# Patient Record
Sex: MALE | Race: WHITE | HISPANIC OR LATINO | Employment: OTHER | URBAN - METROPOLITAN AREA
[De-identification: names, ages, dates, MRNs, and addresses within clinical notes are randomized per-mention and may not be internally consistent; named-entity substitution may affect disease eponyms.]

---

## 2017-12-04 ENCOUNTER — ALLSCRIPTS OFFICE VISIT (OUTPATIENT)
Dept: OTHER | Facility: OTHER | Age: 52
End: 2017-12-04

## 2017-12-04 DIAGNOSIS — I10 ESSENTIAL (PRIMARY) HYPERTENSION: ICD-10-CM

## 2017-12-06 NOTE — PROGRESS NOTES
Assessment    1  Benign essential hypertension (401 1) (I10)   2  Allergic rhinitis (477 9) (J30 9)   3  Dermatitis (692 9) (L30 9)   4  Allergic conjunctivitis (372 14) (H10 10)    Plan  Benign essential hypertension    · AmLODIPine Besylate 10 MG Oral Tablet; take 1 tablet by mouth every day   · (1) CBC/PLT/DIFF; Status:Active; Requested for:[id number];    · (1) COMPREHENSIVE METABOLIC PANEL; Status:Active; Requested for:05Vak1625;    · (1) LIPID PANEL, FASTING; Status:Active; Requested for:09Ffj0751;    · (1) TSH; Status:Active; Requested for:71Qbo5898;   Dermatitis    · PredniSONE 10 MG Oral Tablet; take three tabs daily for three days, take two tabsdaily for three days, then take one tab daily for three days then stolp    Discussion/Summary  Discussion Summary:   Hypertension -his blood pressure was very high today  It has been high over the last few years but he has not been taking any medication  I started him on amlodipine 10 mg daily  He will come back in 2 weeks for recheck of blood pressure  rhinitis- he takes Allegra for a few years now  He does not think it is working  He was told to switch to Zyrtec 10 mg daily  He will also take Flonase nasal spray -he has a rash around his eyes, on his forehead and at the back of his neck  He was given prednisone  conjunctivitis- he has watering of his eyes which initially started the rash around the eyes  He was given erythromycin ointment by an urgent care center  He thinks that it is getting better  It does not improve, he will see Ophthalmology  Counseling Documentation With Imm: The patient was counseled regarding instructions for management,-- risk factor reductions,-- risks and benefits of treatment options,-- importance of compliance with treatment  Medication SE Review and Pt Understands Tx: Possible side effects of new medications were reviewed with the patient/guardian today  The treatment plan was reviewed with the patient/guardian   The patient/guardian understands and agrees with the treatment plan      Chief Complaint  Chief Complaint Free Text Note Form: establish care      History of Present Illness  Conjunctivitis (Brief): The patient is being seen for an initial evaluation of an existing diagnosis of conjunctivitis  Symptoms:  eye discharge,-- eye irritation-- and-- eye itching  Associated symptoms:  lid swelling  Current treatment includes topical antibiotic  Rhinitis (Brief): The patient is being seen for an initial evaluation of an existing diagnosis of rhinitis  Symptoms include:  nasal congestion,-- sneezing,-- postnasal drainage,-- sore throat,-- eye redness,-- eye itching-- and-- eye watering  Known allergies:  dogs,-- cats,-- dust mites-- and-- mold spores  No associated symptoms are reported  Current treatment includes nonsedating antihistamines  Hypertension (Follow-Up): The patient presents for follow-up of essential hypertension  The patient states he has been doing poorly with his blood pressure control since the last visit  He has no comorbid illnesses  He has no significant interval events  Symptoms: The patient is currently asymptomatic  Medications: The patient is not currently on any medications for his hypertension--lifestyle modification only  Dermatitis, Unspecified (Brief): The patient is being seen for an initial evaluation of unspecified dermatitis  Symptoms:  rash,-- skin redness,-- skin dryness-- and-- pruritus  No associated symptoms are reported  The patient is not currently being treated for this problem  Review of Systems  Complete-Male:  Constitutional: No fever or chills, feels well, no tiredness, no recent weight gain or weight loss  Eyes: No complaints of eye pain, no red eyes, no discharge from eyes, no itchy eyes  ENT: as noted in HPI  Cardiovascular: No complaints of slow heart rate, no fast heart rate, no chest pain, no palpitations, no leg claudication, no lower extremity    Respiratory: No complaints of shortness of breath, no wheezing, no cough, no SOB on exertion, no orthopnea or PND  Gastrointestinal: No complaints of abdominal pain, no constipation, no nausea or vomiting, no diarrhea or bloody stools  Genitourinary: No complaints of dysuria, no incontinence, no hesitancy, no nocturia, no genital lesion, no testicular pain  Musculoskeletal: No complaints of arthralgia, no myalgias, no joint swelling or stiffness, no limb pain or swelling  Integumentary: as noted in HPI  Neurological: No compliants of headache, no confusion, no convulsions, no numbness or tingling, no dizziness or fainting, no limb weakness, no difficulty walking  Psychiatric: Is not suicidal, no sleep disturbances, no anxiety or depression, no change in personality, no emotional problems  Endocrine: No complaints of proptosis, no hot flashes, no muscle weakness, no erectile dysfunction, no deepening of the voice, no feelings of weakness  Hematologic/Lymphatic: No complaints of swollen glands, no swollen glands in the neck, does not bleed easily, no easy bruising  Past Medical History  1  History of Injury of foot, right (959 7) (A16 209R)  Active Problems And Past Medical History Reviewed: The active problems and past medical history were reviewed and updated today  Surgical History  1  History of Foot Arthrodesis  Surgical History Reviewed: The surgical history was reviewed and updated today  Family History  Mother    1  Family history of hyperlipidemia (V18 19) (Z83 49)   2  Family history of hypertension (V17 49) (Z82 49)  Family History Reviewed: The family history was reviewed and updated today  Social History     · Non-tobacco user (V49 89) (Z78 9)   · Social alcohol use (Z78 9)  Social History Reviewed: The social history was reviewed and updated today  The social history was reviewed and is unchanged  Current Meds   1  Cetirizine HCl - 10 MG Oral Tablet; take 1 tablet every day;  Therapy: [id number] to (Renew:96Lzo2807) Recorded   2  Erythromycin 5 MG/GM Ophthalmic Ointment; Therapy: 11YJL1410 to Recorded    Allergies    1  Animal dander - Cats   2  Animal dander - Dogs   3  Seasonal    Vitals  Signs   Recorded: 02FXH2795 50:18EG   Systolic: 105  Diastolic: 917  Recorded: 43EPO1248 71:10YR   Systolic: 541  Diastolic: 90  Recorded: 40RZN8486 02:40PM   Heart Rate: 102  Respiration: 16  Systolic: 167  Diastolic: 282  Height: 5 ft 7 in  Weight: 164 lb 8 oz  BMI Calculated: 25 76  BSA Calculated: 1 86    Physical Exam   Constitutional  General appearance: Abnormal   appears tired  Head and Face  Head and face: Normal    Palpation of the face and sinuses: No sinus tenderness  Eyes  Conjunctiva and lids: Abnormal   Conjunctiva Findings: watery discharge bilaterally  Pupils and irises: Equal, round, reactive to light  Ears, Nose, Mouth, and Throat  External inspection of ears and nose: Normal    Otoscopic examination: Tympanic membranes translucent with normal light reflex  Canals patent without erythema  Oropharynx: Normal with no erythema, edema, exudate or lesions  Neck  Neck: Supple, symmetric, trachea midline, no masses  Thyroid: Normal, no thyromegaly  Pulmonary  Respiratory effort: No increased work of breathing or signs of respiratory distress  Auscultation of lungs: Clear to auscultation  Cardiovascular  Palpation of heart: Normal PMI, no thrills  Auscultation of heart: Normal rate and rhythm, normal S1 and S2, no murmurs  Examination of extremities for edema and/or varicosities: Normal    Abdomen  Abdomen: Non-tender, no masses  Lymphatic  Palpation of lymph nodes in neck: No lymphadenopathy  Musculoskeletal  Gait and station: Normal    Skin  Skin and subcutaneous tissue: Abnormal   Clinical impression: non-specific rash (on the face and on both sides of the back of the neck )  Neurologic  Cranial nerves: Cranial nerves 2-12 intact     Cortical function: Normal mental status  Reflexes: 2+ and symmetric  Sensation: No sensory loss     Psychiatric  Judgment and insight: Normal    Mood and affect: Normal        Signatures   Electronically signed by : MOLLY Gottlieb ; Dec  4 2017  4:59PM EST                       (Author)

## 2017-12-18 ENCOUNTER — GENERIC CONVERSION - ENCOUNTER (OUTPATIENT)
Dept: OTHER | Facility: OTHER | Age: 52
End: 2017-12-18

## 2017-12-27 ENCOUNTER — GENERIC CONVERSION - ENCOUNTER (OUTPATIENT)
Dept: INTERNAL MEDICINE CLINIC | Facility: CLINIC | Age: 52
End: 2017-12-27

## 2018-01-23 VITALS
RESPIRATION RATE: 16 BRPM | HEART RATE: 102 BPM | HEIGHT: 67 IN | SYSTOLIC BLOOD PRESSURE: 180 MMHG | BODY MASS INDEX: 25.82 KG/M2 | WEIGHT: 164.5 LBS | DIASTOLIC BLOOD PRESSURE: 100 MMHG

## 2018-01-24 VITALS
DIASTOLIC BLOOD PRESSURE: 100 MMHG | HEART RATE: 92 BPM | RESPIRATION RATE: 14 BRPM | WEIGHT: 163 LBS | BODY MASS INDEX: 25.58 KG/M2 | HEIGHT: 67 IN | SYSTOLIC BLOOD PRESSURE: 132 MMHG

## 2018-01-24 VITALS — DIASTOLIC BLOOD PRESSURE: 90 MMHG | SYSTOLIC BLOOD PRESSURE: 140 MMHG

## 2018-05-30 DIAGNOSIS — I10 HYPERTENSION, UNSPECIFIED TYPE: Primary | ICD-10-CM

## 2018-05-30 RX ORDER — AMLODIPINE BESYLATE 10 MG/1
10 TABLET ORAL DAILY
Qty: 90 TABLET | Refills: 0 | Status: SHIPPED | OUTPATIENT
Start: 2018-05-30 | End: 2018-09-11 | Stop reason: SDUPTHER

## 2018-05-30 RX ORDER — AMLODIPINE BESYLATE 10 MG/1
1 TABLET ORAL DAILY
COMMUNITY
Start: 2017-12-04 | End: 2018-05-30 | Stop reason: SDUPTHER

## 2018-06-01 NOTE — TELEPHONE ENCOUNTER
Pt is cking status of his request for Amlodipine  Rosie Lightning He only has 1 pill left  Rosie Lightning And wasn't sure if  still wants him to to take this med

## 2018-09-11 DIAGNOSIS — I10 HYPERTENSION, UNSPECIFIED TYPE: ICD-10-CM

## 2018-09-11 RX ORDER — AMLODIPINE BESYLATE 10 MG/1
10 TABLET ORAL DAILY
Qty: 90 TABLET | Refills: 0 | Status: SHIPPED | OUTPATIENT
Start: 2018-09-11 | End: 2018-12-12 | Stop reason: SDUPTHER

## 2018-12-11 DIAGNOSIS — I10 HYPERTENSION, UNSPECIFIED TYPE: ICD-10-CM

## 2018-12-12 DIAGNOSIS — I10 HYPERTENSION, UNSPECIFIED TYPE: ICD-10-CM

## 2018-12-12 DIAGNOSIS — I10 BENIGN ESSENTIAL HYPERTENSION: Primary | ICD-10-CM

## 2018-12-12 PROBLEM — J30.9 ALLERGIC RHINITIS: Status: ACTIVE | Noted: 2017-12-04

## 2018-12-12 PROBLEM — L30.9 DERMATITIS: Status: ACTIVE | Noted: 2017-12-04

## 2018-12-12 RX ORDER — AMLODIPINE BESYLATE 10 MG/1
10 TABLET ORAL DAILY
Qty: 90 TABLET | Refills: 0 | Status: SHIPPED | OUTPATIENT
Start: 2018-12-12 | End: 2019-06-10 | Stop reason: SDUPTHER

## 2018-12-13 RX ORDER — AMLODIPINE BESYLATE 10 MG/1
10 TABLET ORAL DAILY
Qty: 90 TABLET | Refills: 0 | Status: SHIPPED | OUTPATIENT
Start: 2018-12-13 | End: 2019-06-12 | Stop reason: SDUPTHER

## 2019-02-19 ENCOUNTER — TELEPHONE (OUTPATIENT)
Dept: INTERNAL MEDICINE CLINIC | Facility: CLINIC | Age: 54
End: 2019-02-19

## 2019-06-10 ENCOUNTER — OFFICE VISIT (OUTPATIENT)
Dept: INTERNAL MEDICINE CLINIC | Facility: CLINIC | Age: 54
End: 2019-06-10

## 2019-06-10 VITALS
BODY MASS INDEX: 26.74 KG/M2 | HEART RATE: 108 BPM | DIASTOLIC BLOOD PRESSURE: 80 MMHG | HEIGHT: 66 IN | SYSTOLIC BLOOD PRESSURE: 118 MMHG | RESPIRATION RATE: 20 BRPM | WEIGHT: 166.4 LBS

## 2019-06-10 DIAGNOSIS — M54.16 LUMBAR RADICULOPATHY: ICD-10-CM

## 2019-06-10 DIAGNOSIS — I10 BENIGN ESSENTIAL HYPERTENSION: Primary | ICD-10-CM

## 2019-06-10 DIAGNOSIS — M54.12 CERVICAL RADICULOPATHY: ICD-10-CM

## 2019-06-10 PROCEDURE — 99214 OFFICE O/P EST MOD 30 MIN: CPT | Performed by: INTERNAL MEDICINE

## 2019-06-10 RX ORDER — GABAPENTIN 300 MG/1
300 CAPSULE ORAL
Qty: 90 CAPSULE | Refills: 1 | Status: SHIPPED | OUTPATIENT
Start: 2019-06-10 | End: 2021-01-11

## 2019-06-10 RX ORDER — METHOCARBAMOL 500 MG/1
500 TABLET, FILM COATED ORAL 2 TIMES DAILY
Qty: 20 TABLET | Refills: 0 | Status: SHIPPED | OUTPATIENT
Start: 2019-06-10 | End: 2021-01-11

## 2019-06-12 ENCOUNTER — TELEPHONE (OUTPATIENT)
Dept: INTERNAL MEDICINE CLINIC | Facility: CLINIC | Age: 54
End: 2019-06-12

## 2019-06-12 DIAGNOSIS — I10 HYPERTENSION, UNSPECIFIED TYPE: ICD-10-CM

## 2019-06-12 DIAGNOSIS — M54.12 CERVICAL RADICULOPATHY: Primary | ICD-10-CM

## 2019-06-12 DIAGNOSIS — M54.16 LUMBAR RADICULOPATHY: ICD-10-CM

## 2019-06-12 RX ORDER — AMLODIPINE BESYLATE 10 MG/1
10 TABLET ORAL DAILY
Qty: 90 TABLET | Refills: 1 | Status: SHIPPED | OUTPATIENT
Start: 2019-06-12 | End: 2019-12-09 | Stop reason: SDUPTHER

## 2019-06-12 NOTE — TELEPHONE ENCOUNTER
CALLED PT   AND WAS NOTIFIED AND WONDERING IF THERE IS ANOTHER ALTERNATIVE FOR HIM , AS MAYBE TO SEE ORTHO OR SOME ONE ELSE-PLEASE ADVISE

## 2019-06-12 NOTE — TELEPHONE ENCOUNTER
PT CALLED TO [facility] AN APPT   WITH DR Gerogina Aleman  AND WAS TOLD THEY DO NOT SEE SELF PAY PATIENTS  WHAT  ELSE SHOULD HE DO?    HE ALSO NEEDS AMLODIPINE  SEND TO [facility]

## 2019-06-12 NOTE — TELEPHONE ENCOUNTER
Amlodipine was sent    He will have to find another pain management doctor who takes self-pay patients

## 2019-12-09 DIAGNOSIS — I10 HYPERTENSION, UNSPECIFIED TYPE: ICD-10-CM

## 2019-12-09 RX ORDER — AMLODIPINE BESYLATE 10 MG/1
10 TABLET ORAL DAILY
Qty: 90 TABLET | Refills: 1 | Status: SHIPPED | OUTPATIENT
Start: 2019-12-09 | End: 2020-06-09 | Stop reason: SDUPTHER

## 2020-06-09 DIAGNOSIS — I10 HYPERTENSION, UNSPECIFIED TYPE: ICD-10-CM

## 2020-06-09 RX ORDER — AMLODIPINE BESYLATE 10 MG/1
10 TABLET ORAL DAILY
Qty: 90 TABLET | Refills: 1 | Status: SHIPPED | OUTPATIENT
Start: 2020-06-09 | End: 2020-12-15 | Stop reason: SDUPTHER

## 2020-12-15 DIAGNOSIS — I10 HYPERTENSION, UNSPECIFIED TYPE: ICD-10-CM

## 2020-12-15 RX ORDER — AMLODIPINE BESYLATE 10 MG/1
10 TABLET ORAL DAILY
Qty: 30 TABLET | Refills: 0 | Status: SHIPPED | OUTPATIENT
Start: 2020-12-15 | End: 2021-01-11 | Stop reason: SDUPTHER

## 2020-12-15 RX ORDER — AMLODIPINE BESYLATE 10 MG/1
10 TABLET ORAL DAILY
Qty: 90 TABLET | Refills: 0 | Status: CANCELLED | OUTPATIENT
Start: 2020-12-15

## 2021-01-11 ENCOUNTER — OFFICE VISIT (OUTPATIENT)
Dept: INTERNAL MEDICINE CLINIC | Facility: CLINIC | Age: 56
End: 2021-01-11
Payer: COMMERCIAL

## 2021-01-11 VITALS
HEART RATE: 99 BPM | TEMPERATURE: 97.4 F | WEIGHT: 171 LBS | SYSTOLIC BLOOD PRESSURE: 130 MMHG | HEIGHT: 67 IN | OXYGEN SATURATION: 98 % | DIASTOLIC BLOOD PRESSURE: 80 MMHG | BODY MASS INDEX: 26.84 KG/M2

## 2021-01-11 DIAGNOSIS — I10 HYPERTENSION, UNSPECIFIED TYPE: ICD-10-CM

## 2021-01-11 DIAGNOSIS — I10 BENIGN ESSENTIAL HYPERTENSION: Primary | ICD-10-CM

## 2021-01-11 DIAGNOSIS — J45.30 MILD PERSISTENT ASTHMA WITHOUT COMPLICATION: ICD-10-CM

## 2021-01-11 DIAGNOSIS — Z11.4 ENCOUNTER FOR SCREENING FOR HIV: ICD-10-CM

## 2021-01-11 DIAGNOSIS — Z12.11 SCREEN FOR COLON CANCER: ICD-10-CM

## 2021-01-11 DIAGNOSIS — E66.3 OVERWEIGHT: ICD-10-CM

## 2021-01-11 DIAGNOSIS — Z11.59 NEED FOR HEPATITIS C SCREENING TEST: ICD-10-CM

## 2021-01-11 DIAGNOSIS — M77.11 LATERAL EPICONDYLITIS OF RIGHT ELBOW: ICD-10-CM

## 2021-01-11 PROCEDURE — 3079F DIAST BP 80-89 MM HG: CPT | Performed by: INTERNAL MEDICINE

## 2021-01-11 PROCEDURE — 3008F BODY MASS INDEX DOCD: CPT | Performed by: INTERNAL MEDICINE

## 2021-01-11 PROCEDURE — 3725F SCREEN DEPRESSION PERFORMED: CPT | Performed by: INTERNAL MEDICINE

## 2021-01-11 PROCEDURE — 1036F TOBACCO NON-USER: CPT | Performed by: INTERNAL MEDICINE

## 2021-01-11 PROCEDURE — 99214 OFFICE O/P EST MOD 30 MIN: CPT | Performed by: INTERNAL MEDICINE

## 2021-01-11 PROCEDURE — 3075F SYST BP GE 130 - 139MM HG: CPT | Performed by: INTERNAL MEDICINE

## 2021-01-11 RX ORDER — AMLODIPINE BESYLATE 10 MG/1
10 TABLET ORAL DAILY
Qty: 30 TABLET | Refills: 0 | Status: SHIPPED | OUTPATIENT
Start: 2021-01-11 | End: 2021-02-22 | Stop reason: SDUPTHER

## 2021-01-11 RX ORDER — ALBUTEROL SULFATE 90 UG/1
2 AEROSOL, METERED RESPIRATORY (INHALATION) EVERY 6 HOURS PRN
Qty: 1 INHALER | Refills: 5 | Status: SHIPPED | OUTPATIENT
Start: 2021-01-11

## 2021-01-11 NOTE — PROGRESS NOTES
INTERNAL MEDICINE OFFICE VISIT  [facility], [address]  Tel: [phone]      NAME: Pietro Escalona  AGE: 63 y o  SEX: male  : 1965   MRN: [id number]    DATE: 2021  TIME: 3:35 PM      Assessment and Plan:  1  Benign essential hypertension   continue amlodipine  - CBC and differential; Future  - Comprehensive metabolic panel; Future  - Lipid panel; Future  - TSH, 3rd generation; Future    2  Mild persistent asthma without complication   was advised to take albuterol inhaler as needed  - albuterol (PROVENTIL HFA,VENTOLIN HFA) 90 mcg/act inhaler; Inhale 2 puffs every 6 (six) hours as needed for wheezing or shortness of breath  Dispense: 1 Inhaler; Refill: 5    3  Overweight  BMI Counseling: Body mass index is 26 78 kg/m²  The BMI is above normal  Nutrition recommendations include decreasing portion sizes, encouraging healthy choices of fruits and vegetables and moderation in carbohydrate intake  Exercise recommendations include moderate physical activity 150 minutes/week  No pharmacotherapy was ordered  4  Lateral epicondylitis of right elbow    - Ambulatory referral to Sports Medicine; Future    5  Hypertension, unspecified type    - amLODIPine (NORVASC) 10 mg tablet; Take 1 tablet (10 mg total) by mouth daily  Dispense: 30 tablet; Refill: 0    6  Screen for colon cancer    - Ambulatory referral to Gastroenterology; Future    7  Encounter for screening for HIV    - HIV 1/2 Antigen/Antibody (4th Generation) w Reflex [facility]; Future    8  Need for hepatitis C screening test    - Hepatitis C antibody; Future      - Counseling Documentation: patient was counseled regarding: diagnostic results, instructions for management, risk factor reductions, prognosis, patient and family education, risks and benefits of treatment options and importance of compliance with treatment  - Medication Side Effects:  Adverse side effects of medications were reviewed with the patient/guardian today  Return for follow up visit in  6 months or earlier, if needed        Chief Complaint:  Chief Complaint   Patient presents with    Follow-up     Handi cap- placard    Medication Refill         History of Present Illness:    hypertension- blood pressure is stable on present medication  Asthma- has wheezing and shortness of breath off and on during the cold season  Overweight-was told to try to lose weight   Right elbow pain - has pain in the right elbow and right wrist especially when he picks up heavy objects  Patient is asking for handicap placard      Active Problem List:  Patient Active Problem List   Diagnosis    Allergic rhinitis    Benign essential hypertension    Dermatitis    Cervical radiculopathy    Lumbar radiculopathy    WBC disease    Mild persistent asthma without complication    Overweight    Lateral epicondylitis of right elbow         Past Medical History:  Past Medical History:   Diagnosis Date    Allergic conjunctivitis     Resolved 12/18/2017          Past Surgical History:  Past Surgical History:   Procedure Laterality Date    FOOT ARTHRODESIS           Family History:  Family History   Problem Relation Age of Onset    Hyperlipidemia Mother     Hypertension Mother          Social History:  Social History     Socioeconomic History    Marital status: Single     Spouse name: None    Number of children: None    Years of education: None    Highest education level: None   Occupational History    None   Social Needs    Financial resource strain: None    Food insecurity     Worry: None     Inability: None    Transportation needs     Medical: None     Non-medical: None   Tobacco Use    Smoking status: Never Smoker    Smokeless tobacco: Never Used   Substance and Sexual Activity    Alcohol use: Yes     Frequency: 4 or more times a week     Drinks per session: 1 or 2     Binge frequency: Never     Comment: Social     Drug use: Never    Sexual activity: Yes     Partners: Female   Lifestyle    Physical activity     Days per week: 0 days     Minutes per session: 0 min    Stress: Not at all   Relationships    Social connections     Talks on phone: None     Gets together: None     Attends [employer] service: None     Active member of club or organization: None     Attends meetings of clubs or organizations: None     Relationship status: None    Intimate partner violence     Fear of current or ex partner: None     Emotionally abused: None     Physically abused: None     Forced sexual activity: None   Other Topics Concern    None   Social History Narrative    None         Allergies:   Allergies   Allergen Reactions    Cat Hair Extract     Dog Epithelium     Dust Mite Extract     Molds & Smuts     Other     Pollen Extract     Dario Grass Pollen Allergen          Medications:    Current Outpatient Medications:     amLODIPine (NORVASC) 10 mg tablet, Take 1 tablet (10 mg total) by mouth daily, Disp: 30 tablet, Rfl: 0    albuterol (PROVENTIL HFA,VENTOLIN HFA) 90 mcg/act inhaler, Inhale 2 puffs every 6 (six) hours as needed for wheezing or shortness of breath, Disp: 1 Inhaler, Rfl: 5      The following portions of the patient's history were reviewed and updated as appropriate: past medical history, past surgical history, family history, social history, allergies, current medications and active problem list       Review of Systems:  Constitutional: Denies fever, chills, weight gain, weight loss, fatigue  Eyes: Denies eye redness, eye discharge, double vision, change in visual acuity  ENT: Denies hearing loss, tinnitus, sneezing, nasal congestion, nasal discharge, sore throat   Respiratory: Denies cough, expectoration, hemoptysis, shortness of breath, wheezing  Cardiovascular: Denies chest pain, palpitations, lower extremity swelling, orthopnea, PND  Gastrointestinal: Denies abdominal pain, heartburn, nausea, vomiting, hematemesis, diarrhea, bloody stools  Genito-Urinary: Denies dysuria, frequency, difficulty in micturition, nocturia, incontinence  Musculoskeletal: Denies back pain,  Has pain in the right elbow and right wrist  Neurologic: Denies confusion, lightheadedness, syncope, headache, focal weakness, sensory changes, seizures  Endocrine: Denies polyuria, polydipsia, temperature intolerance  Allergy and Immunology: Denies hives, insect bite sensitivity  Hematological and Lymphatic: Denies bleeding problems, swollen glands   Psychological: Denies depression, suicidal ideation, anxiety, panic, mood swings  Dermatological: Denies pruritus, rash, skin lesion changes      Vitals:  Vitals:    01/11/21 1534   BP: 130/80   Pulse:    Temp:    SpO2:        Body mass index is 26 78 kg/m²  Weight (last 2 days)     Date/Time   Weight    01/11/21 1503   77 6 (171)                Physical Examination:  General: Patient is not in acute distress  Awake, alert, responding to commands  No weight gain or loss  Head: Normocephalic  Atraumatic  Eyes: Conjunctiva and lids with no swelling, erythema or discharge  Both pupils normal sized, round and reactive to light  Sclera nonicteric  ENT: External examination of nose and ear normal  Otoscopic examination shows translucent tympanic membranes with patent canals without erythema  Oropharynx moist with no erythema, edema, exudate or lesions  Neck: Supple  JVP not raised  Trachea midline  No masses  No thyromegaly  Lungs: No signs of increased work of breathing or respiratory distress  Bilateral bronchovascular breath sounds with no crackles or rhonchi  Chest wall: No tenderness  Cardiovascular: Normal PMI  No thrills  Regular rate and rhythm  S1 and S2 normal  No murmur, rub or gallop  Gastrointestinal: Abdomen soft, nontender  No guarding or rigidity  Liver and spleen not palpable  Bowel sounds present  Neurologic: Cranial nerves II-XII intact   Cortical functions normal  Motor system - Reflexes 2+ and symmetrical  Sensations normal  Musculoskeletal: Gait normal  No joint tenderness  Integumentary: Skin normal with no rash or lesions  Lymphatic: No palpable lymph nodes in neck, axilla or groin  Extremities: No clubbing, cyanosis, edema or varicosities  Psychological: Judgement and insight normal  Mood and affect normal      Laboratory Results:  CBC with diff:   No results found for: WBC, RBC, HGB, HCT, MCV, MCH, RDW, PLT    CMP:  No results found for: CREATININE, BUN, NA, K, CL, CO2, GLUCOSE, PROT, ALKPHOS, ALT, AST, BILIDIR    No results found for: HGBA1C, MG, PHOS    No results found for: TROPONINI, CKMB, CKTOTAL    Lipid Profile:   No results found for: CHOL  No results found for: HDL  No results found for: LDLCALC  No results found for: TRIG    Imaging Results:  No image results found         Health Maintenance:  Health Maintenance   Topic Date Due    Hepatitis C Screening  1965    Pneumococcal Vaccine: Pediatrics (0 to 5 Years) and At-Risk Patients (6 to 59 Years) (1 of 1 - PPSV23) 06/16/1970    HIV Screening  06/16/1979    BMI: Followup Plan  06/16/1982    Annual Physical  06/16/1982    DTaP,Tdap,and Td Vaccines (1 - Tdap) 06/16/1985    Colorectal Cancer Screening  06/16/2014    Influenza Vaccine (1) 09/01/2020    Depression Screening PHQ  01/11/2022    BMI: Adult  01/11/2022    HIB Vaccine  Aged Out    Hepatitis B Vaccine  Aged Out    IPV Vaccine  Aged Out    Hepatitis A Vaccine  Aged Out    Meningococcal ACWY Vaccine  Aged Out    HPV Vaccine  Aged [employer] History   Administered Date(s) Administered    Hep B, adult 11/08/2004    Td (adult), Unspecified 07/17/2003         Patrica De La Cruz MD  1/11/2021,3:35 PM

## 2021-01-25 ENCOUNTER — TELEPHONE (OUTPATIENT)
Dept: INTERNAL MEDICINE CLINIC | Facility: CLINIC | Age: 56
End: 2021-01-25

## 2021-01-25 NOTE — TELEPHONE ENCOUNTER
PT LEFT FORM IN BIN - TODAY - DISABILITY PARKING PLACARD    PLEASE FILL OUT - CALL PT WHEN READY TO

## 2021-01-27 ENCOUNTER — TELEPHONE (OUTPATIENT)
Dept: INTERNAL MEDICINE CLINIC | Facility: CLINIC | Age: 56
End: 2021-01-27

## 2021-01-27 NOTE — TELEPHONE ENCOUNTER
Called and left a message that his Disability Parking Placard Application has been completed by Malena Burgos and he can come in to pick it up or call us if he would like us to have it mailed to his home

## 2021-02-22 ENCOUNTER — TELEPHONE (OUTPATIENT)
Dept: INTERNAL MEDICINE CLINIC | Facility: CLINIC | Age: 56
End: 2021-02-22

## 2021-02-22 DIAGNOSIS — I10 HYPERTENSION, UNSPECIFIED TYPE: ICD-10-CM

## 2021-02-22 RX ORDER — AMLODIPINE BESYLATE 10 MG/1
10 TABLET ORAL DAILY
Qty: 90 TABLET | Refills: 0 | Status: SHIPPED | OUTPATIENT
Start: 2021-02-22 | End: 2021-05-28 | Stop reason: SDUPTHER

## 2021-03-17 ENCOUNTER — TELEPHONE (OUTPATIENT)
Dept: GASTROENTEROLOGY | Facility: CLINIC | Age: 56
End: 2021-03-17

## 2021-03-17 NOTE — TELEPHONE ENCOUNTER
03/17/21  Screened by: Kenny Ramirez    Referring Provider dr Whitfield Miguelina: If patient answers NO to medical questions, then schedule procedure  If patient answers YES to medical questions, then schedule office appointment  Previous Colonoscopy no  Date and Facility of last colonoscopy? Comments:         Pre- Screening: There is no height or weight on file to calculate BMI  Has patient been referred for a routine screening Colonoscopy? yes  Is the patient between 39-70 years old? yes    SCHEDULING STAFF   If the patient is between 45yrs-49yrs, please advise patient to confirm benefits/coverage with their insurance company for a routine screening colonoscopy, some insurance carriers will only cover at [phone] or older   If the patient is over 76years old, please schedule an office visit   If the patient had a previous colonoscopy send to the procedure  before continuing    Have you been diagnosed with a bleeding disorder or anemia? no    Do you take no    Have you been diagnosed with Diabetes or are you taking any   Diabetic medications? no    Do you have any of the following symptoms?  no    Have you had a coronary or vascular stent within the last year? no    Have you had a heart attack or stroke in the last 6 months? no    Have you had intestinal surgery in the last 3 months? no    Do you have problems with: no    Do you use: no    Have you been hospitalized in the last Month? no    Have you had chest pain (angina) or breathing problems  (COPD) in the last 3 months? no    Do you have any difficulty walking up a flight of stairs? no    Have you had Kidney failure or insufficiency? no    Have you had heart valve surgery? no    Are you confined to a wheelchair?  no

## 2021-04-13 DIAGNOSIS — Z23 ENCOUNTER FOR IMMUNIZATION: ICD-10-CM

## 2021-04-19 ENCOUNTER — ANESTHESIA (OUTPATIENT)
Dept: GASTROENTEROLOGY | Facility: HOSPITAL | Age: 56
End: 2021-04-19

## 2021-04-19 ENCOUNTER — HOSPITAL ENCOUNTER (OUTPATIENT)
Dept: GASTROENTEROLOGY | Facility: HOSPITAL | Age: 56
Setting detail: OUTPATIENT SURGERY
Discharge: HOME | End: 2021-04-19
Attending: INTERNAL MEDICINE | Admitting: INTERNAL MEDICINE
Payer: COMMERCIAL

## 2021-04-19 ENCOUNTER — ANESTHESIA EVENT (OUTPATIENT)
Dept: GASTROENTEROLOGY | Facility: HOSPITAL | Age: 56
End: 2021-04-19

## 2021-04-19 VITALS
HEIGHT: 67 IN | WEIGHT: 166.01 LBS | BODY MASS INDEX: 26.06 KG/M2 | HEART RATE: 85 BPM | TEMPERATURE: 97.6 F | OXYGEN SATURATION: 97 % | RESPIRATION RATE: 16 BRPM | DIASTOLIC BLOOD PRESSURE: 90 MMHG | SYSTOLIC BLOOD PRESSURE: 138 MMHG

## 2021-04-19 DIAGNOSIS — Z12.11 SCREENING FOR COLON CANCER: ICD-10-CM

## 2021-04-19 PROCEDURE — 88305 TISSUE EXAM BY PATHOLOGIST: CPT | Performed by: PATHOLOGY

## 2021-04-19 PROCEDURE — 45385 COLONOSCOPY W/LESION REMOVAL: CPT | Performed by: INTERNAL MEDICINE

## 2021-04-19 PROCEDURE — 45380 COLONOSCOPY AND BIOPSY: CPT | Performed by: INTERNAL MEDICINE

## 2021-04-19 RX ORDER — PROPOFOL 10 MG/ML
INJECTION, EMULSION INTRAVENOUS AS NEEDED
Status: DISCONTINUED | OUTPATIENT
Start: 2021-04-19 | End: 2021-04-19

## 2021-04-19 RX ORDER — SODIUM CHLORIDE, SODIUM LACTATE, POTASSIUM CHLORIDE, CALCIUM CHLORIDE 600; 310; 30; 20 MG/100ML; MG/100ML; MG/100ML; MG/100ML
INJECTION, SOLUTION INTRAVENOUS CONTINUOUS PRN
Status: DISCONTINUED | OUTPATIENT
Start: 2021-04-19 | End: 2021-04-19

## 2021-04-19 RX ORDER — SODIUM CHLORIDE, SODIUM LACTATE, POTASSIUM CHLORIDE, CALCIUM CHLORIDE 600; 310; 30; 20 MG/100ML; MG/100ML; MG/100ML; MG/100ML
125 INJECTION, SOLUTION INTRAVENOUS CONTINUOUS
Status: DISCONTINUED | OUTPATIENT
Start: 2021-04-19 | End: 2021-04-23 | Stop reason: HOSPADM

## 2021-04-19 RX ADMIN — PROPOFOL 20 MG: 10 INJECTION, EMULSION INTRAVENOUS at 08:06

## 2021-04-19 RX ADMIN — PROPOFOL 30 MG: 10 INJECTION, EMULSION INTRAVENOUS at 08:01

## 2021-04-19 RX ADMIN — SODIUM CHLORIDE, SODIUM LACTATE, POTASSIUM CHLORIDE, AND CALCIUM CHLORIDE: .6; .31; .03; .02 INJECTION, SOLUTION INTRAVENOUS at 07:53

## 2021-04-19 RX ADMIN — SODIUM CHLORIDE, SODIUM LACTATE, POTASSIUM CHLORIDE, AND CALCIUM CHLORIDE 125 ML/HR: .6; .31; .03; .02 INJECTION, SOLUTION INTRAVENOUS at 07:50

## 2021-04-19 RX ADMIN — PROPOFOL 100 MG: 10 INJECTION, EMULSION INTRAVENOUS at 08:00

## 2021-04-19 RX ADMIN — PROPOFOL 20 MG: 10 INJECTION, EMULSION INTRAVENOUS at 08:03

## 2021-04-19 NOTE — H&P
History and Physical -  [facility]  Lauren Quiñones 63 y o  male MRN: [id number]                  HPI: Lauren Quiñones is a 63y o  year old male who presents for initial average risk screening colonoscopy  No prior history of colonoscopy  Asymptomatic  No family history      REVIEW OF SYSTEMS: Per the HPI, and otherwise unremarkable  Historical Information   Past Medical History:   Diagnosis Date    Allergic conjunctivitis     Resolved 12/18/2017      Past Surgical History:   Procedure Laterality Date    FOOT ARTHRODESIS       Social History   Social History     Substance and Sexual Activity   Alcohol Use Yes    Frequency: 4 or more times a week    Drinks per session: 1 or 2    Binge frequency: Never    Comment: Social      Social History     Substance and Sexual Activity   Drug Use Never     Social History     Tobacco Use   Smoking Status Never Smoker   Smokeless Tobacco Never Used     Family History   Problem Relation Age of Onset    Hyperlipidemia Mother     Hypertension Mother        Meds/Allergies     (Not in a hospital admission)      Allergies   Allergen Reactions    Cat Hair Extract     Dog Epithelium     Dust Mite Extract     Molds & Smuts     Other     Pollen Extract     Dario Grass Pollen Allergen        Objective     Blood pressure 156/98, pulse 102, temperature 97 6 °F (36 4 °C), temperature source Temporal, resp  rate 17, height 5' 7" (1 702 m), weight 75 3 kg (166 lb 0 1 oz), SpO2 98 %        PHYSICAL EXAM    Gen: NAD  CV: RRR  CHEST: Clear  ABD: soft, NT/ND  EXT: no edema  Neuro: AAO      ASSESSMENT/PLAN:  This is a 63y o  year old male here for initial average risk screen    PLAN:   Procedure:  Colonoscopy

## 2021-04-19 NOTE — ANESTHESIA PREPROCEDURE EVALUATION
Procedure:  COLONOSCOPY    Relevant Problems   CARDIO   (+) Benign essential hypertension      PULMONARY   (+) Asthma   (+) Mild persistent asthma without complication        Physical Exam    Airway    Mallampati score: II  TM Distance: >3 FB  Neck ROM: full     Dental   Comment: Denies loose teeth,     Cardiovascular  Cardiovascular exam normal    Pulmonary  Pulmonary exam normal     Other Findings  Portions of exam deferred due to low yield and/or unknown COVID status      Anesthesia Plan  ASA Score- 2     Anesthesia Type- IV sedation with anesthesia with ASA Monitors  Additional Monitors:   Airway Plan:           Plan Factors-Exercise tolerance (METS): >4 METS  Chart reviewed  Existing labs reviewed  Patient summary reviewed  Patient is not a current smoker  Induction- intravenous  Postoperative Plan-     Informed Consent- Anesthetic plan and risks discussed with patient  I personally reviewed this patient with the CRNA  Discussed and agreed on the Anesthesia Plan with the CRNA  Gavin Huston

## 2021-05-01 ENCOUNTER — IMMUNIZATIONS (OUTPATIENT)
Dept: FAMILY MEDICINE CLINIC | Facility: HOSPITAL | Age: 56
End: 2021-05-01

## 2021-05-01 DIAGNOSIS — Z23 ENCOUNTER FOR IMMUNIZATION: Primary | ICD-10-CM

## 2021-05-01 PROCEDURE — 91300 SARS-COV-2 / COVID-19 MRNA VACCINE (PFIZER-BIONTECH) 30 MCG: CPT

## 2021-05-01 PROCEDURE — 0001A SARS-COV-2 / COVID-19 MRNA VACCINE (PFIZER-BIONTECH) 30 MCG: CPT

## 2021-05-28 ENCOUNTER — IMMUNIZATIONS (OUTPATIENT)
Dept: FAMILY MEDICINE CLINIC | Facility: HOSPITAL | Age: 56
End: 2021-05-28

## 2021-05-28 DIAGNOSIS — I10 HYPERTENSION, UNSPECIFIED TYPE: ICD-10-CM

## 2021-05-28 DIAGNOSIS — Z23 ENCOUNTER FOR IMMUNIZATION: Primary | ICD-10-CM

## 2021-05-28 PROCEDURE — 91300 SARS-COV-2 / COVID-19 MRNA VACCINE (PFIZER-BIONTECH) 30 MCG: CPT

## 2021-05-28 PROCEDURE — 0002A SARS-COV-2 / COVID-19 MRNA VACCINE (PFIZER-BIONTECH) 30 MCG: CPT

## 2021-05-28 RX ORDER — AMLODIPINE BESYLATE 10 MG/1
10 TABLET ORAL DAILY
Qty: 90 TABLET | Refills: 1 | Status: SHIPPED | OUTPATIENT
Start: 2021-05-28 | End: 2021-11-26 | Stop reason: SDUPTHER

## 2021-07-07 ENCOUNTER — TELEPHONE (OUTPATIENT)
Dept: INTERNAL MEDICINE CLINIC | Facility: CLINIC | Age: 56
End: 2021-07-07

## 2021-07-16 ENCOUNTER — APPOINTMENT (OUTPATIENT)
Dept: LAB | Facility: CLINIC | Age: 56
End: 2021-07-16
Payer: COMMERCIAL

## 2021-07-16 DIAGNOSIS — I10 BENIGN ESSENTIAL HYPERTENSION: ICD-10-CM

## 2021-07-16 LAB
ALBUMIN SERPL BCP-MCNC: 4 G/DL (ref 3.5–5)
ALP SERPL-CCNC: 82 U/L (ref 46–116)
ALT SERPL W P-5'-P-CCNC: 41 U/L (ref 12–78)
ANION GAP SERPL CALCULATED.3IONS-SCNC: 7 MMOL/L (ref 4–13)
AST SERPL W P-5'-P-CCNC: 29 U/L (ref 5–45)
BASOPHILS # BLD AUTO: 0.05 THOUSANDS/ΜL (ref 0–0.1)
BASOPHILS NFR BLD AUTO: 1 % (ref 0–1)
BILIRUB SERPL-MCNC: 0.97 MG/DL (ref 0.2–1)
BUN SERPL-MCNC: 8 MG/DL (ref 5–25)
CALCIUM SERPL-MCNC: 9.6 MG/DL (ref 8.3–10.1)
CHLORIDE SERPL-SCNC: 104 MMOL/L (ref 100–108)
CHOLEST SERPL-MCNC: 280 MG/DL (ref 50–200)
CO2 SERPL-SCNC: 25 MMOL/L (ref 21–32)
CREAT SERPL-MCNC: 1.21 MG/DL (ref 0.6–1.3)
EOSINOPHIL # BLD AUTO: 0.25 THOUSAND/ΜL (ref 0–0.61)
EOSINOPHIL NFR BLD AUTO: 7 % (ref 0–6)
ERYTHROCYTE [DISTWIDTH] IN BLOOD BY AUTOMATED COUNT: 13.1 % (ref 11.6–15.1)
GFR SERPL CREATININE-BSD FRML MDRD: 66 ML/MIN/1.73SQ M
GLUCOSE P FAST SERPL-MCNC: 103 MG/DL (ref 65–99)
HCT VFR BLD AUTO: 43.1 % (ref 36.5–49.3)
HDLC SERPL-MCNC: 75 MG/DL
HGB BLD-MCNC: 15.3 G/DL (ref 12–17)
IMM GRANULOCYTES # BLD AUTO: 0.01 THOUSAND/UL (ref 0–0.2)
IMM GRANULOCYTES NFR BLD AUTO: 0 % (ref 0–2)
LDLC SERPL CALC-MCNC: 176 MG/DL (ref 0–100)
LYMPHOCYTES # BLD AUTO: 0.5 THOUSANDS/ΜL (ref 0.6–4.47)
LYMPHOCYTES NFR BLD AUTO: 14 % (ref 14–44)
MCH RBC QN AUTO: 33.4 PG (ref 26.8–34.3)
MCHC RBC AUTO-ENTMCNC: 35.5 G/DL (ref 31.4–37.4)
MCV RBC AUTO: 94 FL (ref 82–98)
MONOCYTES # BLD AUTO: 0.59 THOUSAND/ΜL (ref 0.17–1.22)
MONOCYTES NFR BLD AUTO: 17 % (ref 4–12)
NEUTROPHILS # BLD AUTO: 2.1 THOUSANDS/ΜL (ref 1.85–7.62)
NEUTS SEG NFR BLD AUTO: 61 % (ref 43–75)
NONHDLC SERPL-MCNC: 205 MG/DL
NRBC BLD AUTO-RTO: 0 /100 WBCS
PLATELET # BLD AUTO: 189 THOUSANDS/UL (ref 149–390)
PMV BLD AUTO: 9.5 FL (ref 8.9–12.7)
POTASSIUM SERPL-SCNC: 3.5 MMOL/L (ref 3.5–5.3)
PROT SERPL-MCNC: 8.2 G/DL (ref 6.4–8.2)
RBC # BLD AUTO: 4.58 MILLION/UL (ref 3.88–5.62)
SODIUM SERPL-SCNC: 136 MMOL/L (ref 136–145)
TRIGL SERPL-MCNC: 144 MG/DL
TSH SERPL DL<=0.05 MIU/L-ACNC: 6.96 UIU/ML (ref 0.36–3.74)
WBC # BLD AUTO: 3.5 THOUSAND/UL (ref 4.31–10.16)

## 2021-07-16 PROCEDURE — 84443 ASSAY THYROID STIM HORMONE: CPT

## 2021-07-16 PROCEDURE — 80061 LIPID PANEL: CPT

## 2021-07-16 PROCEDURE — 80053 COMPREHEN METABOLIC PANEL: CPT

## 2021-07-16 PROCEDURE — 36415 COLL VENOUS BLD VENIPUNCTURE: CPT

## 2021-07-16 PROCEDURE — 85025 COMPLETE CBC W/AUTO DIFF WBC: CPT

## 2021-07-21 ENCOUNTER — OFFICE VISIT (OUTPATIENT)
Dept: INTERNAL MEDICINE CLINIC | Facility: CLINIC | Age: 56
End: 2021-07-21
Payer: COMMERCIAL

## 2021-07-21 VITALS
HEIGHT: 67 IN | SYSTOLIC BLOOD PRESSURE: 118 MMHG | TEMPERATURE: 97.9 F | DIASTOLIC BLOOD PRESSURE: 88 MMHG | BODY MASS INDEX: 25.74 KG/M2 | WEIGHT: 164 LBS

## 2021-07-21 DIAGNOSIS — E78.2 MIXED HYPERLIPIDEMIA: ICD-10-CM

## 2021-07-21 DIAGNOSIS — R73.9 HYPERGLYCEMIA: ICD-10-CM

## 2021-07-21 DIAGNOSIS — M79.641 PAIN OF RIGHT HAND: ICD-10-CM

## 2021-07-21 DIAGNOSIS — E03.9 ACQUIRED HYPOTHYROIDISM: ICD-10-CM

## 2021-07-21 DIAGNOSIS — M79.10 MYALGIA: ICD-10-CM

## 2021-07-21 DIAGNOSIS — R10.11 RIGHT UPPER QUADRANT ABDOMINAL PAIN: ICD-10-CM

## 2021-07-21 DIAGNOSIS — I10 BENIGN ESSENTIAL HYPERTENSION: Primary | ICD-10-CM

## 2021-07-21 DIAGNOSIS — M54.12 CERVICAL RADICULOPATHY: ICD-10-CM

## 2021-07-21 PROCEDURE — 99214 OFFICE O/P EST MOD 30 MIN: CPT | Performed by: INTERNAL MEDICINE

## 2021-07-21 PROCEDURE — 3074F SYST BP LT 130 MM HG: CPT | Performed by: INTERNAL MEDICINE

## 2021-07-21 PROCEDURE — 3079F DIAST BP 80-89 MM HG: CPT | Performed by: INTERNAL MEDICINE

## 2021-07-21 RX ORDER — ATORVASTATIN CALCIUM 20 MG/1
20 TABLET, FILM COATED ORAL DAILY
Qty: 90 TABLET | Refills: 3 | Status: SHIPPED | OUTPATIENT
Start: 2021-07-21 | End: 2021-11-26 | Stop reason: SDUPTHER

## 2021-07-21 NOTE — PROGRESS NOTES
INTERNAL MEDICINE OFFICE VISIT  [facility]  [facility], [place], [address] [place] [address]  Tel: [phone]      NAME: Lacy Calixto  AGE: 61 y o  SEX: male  : 1965   MRN: [id number]    DATE: 2021  TIME: 3:00 PM      Assessment and Plan:  1  Benign essential hypertension   continue present medications    2  Mixed hyperlipidemia    Was told to start taking atorvastatin and cut down on the fat intake in his diet  I will recheck labs in 4 months  - atorvastatin (LIPITOR) 20 mg tablet; Take 1 tablet (20 mg total) by mouth daily  Dispense: 90 tablet; Refill: 3  - CBC and differential; Future  - Comprehensive metabolic panel; Future  - Lipid panel; Future  - TSH, 3rd generation; Future    3  Acquired hypothyroidism   follow-up TSH levels    4  Hyperglycemia   was advised a low-carbohydrate diet  - Hemoglobin A1C; Future    5  Pain of right hand    - Ambulatory referral to Hand Surgery; Future    6  Cervical radiculopathy    - Ambulatory referral to Neurology; Future    7  Myalgia    - Vitamin D 25 hydroxy; Future    8  Right upper quadrant abdominal pain    - US abdomen limited; Future      - Counseling Documentation: patient was counseled regarding: diagnostic results, instructions for management, risk factor reductions, prognosis, patient and family education, risks and benefits of treatment options and importance of compliance with treatment  - Medication Side Effects: Adverse side effects of medications were reviewed with the patient/guardian today  Return for follow up visit in  4 months or earlier, if needed  Chief Complaint:  Chief Complaint   Patient presents with    fu         History of Present Illness:    blood pressure is stable on the medications  His LDL was very high at 176 and he needs medication for it  He says he eats healthy  His TSH was also high but I did not start him on any medication  His fasting glucose was 103    He was made aware of it  He has pain in his right hand and needs to see a hand surgeon  He also has pain in his neck and upper back on the left side  He had problems with his gallbladder and was told to get surgery done but at that time he did not have insurance  His abdominal discomfort continues        Active Problem List:  Patient Active Problem List   Diagnosis    Allergic rhinitis    Benign essential hypertension    Dermatitis    Cervical radiculopathy    Lumbar radiculopathy    WBC disease    Mild persistent asthma without complication    Overweight    Lateral epicondylitis of right elbow    Asthma    Hyperglycemia    Mixed hyperlipidemia    Acquired hypothyroidism    Pain of right hand    Right upper quadrant abdominal pain         Past Medical History:  Past Medical History:   Diagnosis Date    Allergic conjunctivitis     Resolved 12/18/2017          Past Surgical History:  Past Surgical History:   Procedure Laterality Date    COLONOSCOPY  2021    FOOT ARTHRODESIS           Family History:  Family History   Problem Relation Age of Onset    Hyperlipidemia Mother     Hypertension Mother          Social History:  Social History     Socioeconomic History    Marital status: Single     Spouse name: None    Number of children: None    Years of education: None    Highest education level: None   Occupational History    None   Tobacco Use    Smoking status: Never Smoker    Smokeless tobacco: Never Used   Vaping Use    Vaping Use: Never used   Substance and Sexual Activity    Alcohol use: Yes     Comment: Social     Drug use: Never    Sexual activity: Yes     Partners: Female   Other Topics Concern    None   Social History Narrative    None     Social Determinants of Health     Financial Resource Strain:     Difficulty of Paying Living Expenses:    Food Insecurity:     Worried About Running Out of Food in the Last Year:     Ran Out of Food in the Last Year:    Transportation Needs:     Lack of Transportation (Medical):  Lack of Transportation (Non-Medical):    Physical Activity: Inactive    Days of Exercise per Week: 0 days    Minutes of Exercise per Session: 0 min   Stress: No Stress Concern Present    Feeling of Stress : Not at all   Social Connections:     Frequency of Communication with Friends and Family:     Frequency of Social Gatherings with Friends and Family:     Attends Alevism Services:     Active Member of Clubs or Organizations:     Attends Club or Organization Meetings:     Marital Status:    Intimate Partner Violence:     Fear of Current or Ex-Partner:     Emotionally Abused:     Physically Abused:     Sexually Abused: Allergies:   Allergies   Allergen Reactions    Cat Hair Extract     Dog Epithelium     Dust Mite Extract     Molds & Smuts     Other     Pollen Extract     Dario Grass Pollen Allergen          Medications:    Current Outpatient Medications:     albuterol (PROVENTIL HFA,VENTOLIN HFA) 90 mcg/act inhaler, Inhale 2 puffs every 6 (six) hours as needed for wheezing or shortness of breath, Disp: 1 Inhaler, Rfl: 5    amLODIPine (NORVASC) 10 mg tablet, Take 1 tablet (10 mg total) by mouth daily, Disp: 90 tablet, Rfl: 1    atorvastatin (LIPITOR) 20 mg tablet, Take 1 tablet (20 mg total) by mouth daily, Disp: 90 tablet, Rfl: 3      The following portions of the patient's history were reviewed and updated as appropriate: past medical history, past surgical history, family history, social history, allergies, current medications and active problem list       Review of Systems:  Constitutional: Denies fever, chills, weight gain, weight loss, fatigue  Eyes: Denies eye redness, eye discharge, double vision, change in visual acuity  ENT: Denies hearing loss, tinnitus, sneezing, nasal congestion, nasal discharge, sore throat   Respiratory: Denies cough, expectoration, hemoptysis, shortness of breath, wheezing  Cardiovascular: Denies chest pain, palpitations, lower extremity swelling, orthopnea, PND  Gastrointestinal:  Has right upper abdominal pain, denies heartburn, nausea, vomiting, hematemesis, diarrhea, bloody stools  Genito-Urinary: Denies dysuria, frequency, difficulty in micturition, nocturia, incontinence  Musculoskeletal:   Has upper back pain,hand joint pain, muscle pain  Neurologic: Denies confusion, lightheadedness, syncope, headache, focal weakness, sensory changes, seizures  Endocrine: Denies polyuria, polydipsia, temperature intolerance  Allergy and Immunology: Denies hives, insect bite sensitivity  Hematological and Lymphatic: Denies bleeding problems, swollen glands   Psychological: Denies depression, suicidal ideation, anxiety, panic, mood swings  Dermatological: Denies pruritus, rash, skin lesion changes      Vitals:  Vitals:    07/21/21 1430   BP: 118/88   Temp: 97 9 °F (36 6 °C)       Body mass index is 25 69 kg/m²  Weight (last 2 days)     Date/Time   Weight    07/21/21 1430   74 4 (164)                Physical Examination:  General: Patient is not in acute distress  Awake, alert, responding to commands  No weight gain or loss  Head: Normocephalic  Atraumatic  Eyes: Conjunctiva and lids with no swelling, erythema or discharge  Both pupils normal sized, round and reactive to light  Sclera nonicteric  ENT: External examination of nose and ear normal  Otoscopic examination shows translucent tympanic membranes with patent canals without erythema  Oropharynx moist with no erythema, edema, exudate or lesions  Neck: Supple  JVP not raised  Trachea midline  No masses  No thyromegaly  Lungs: No signs of increased work of breathing or respiratory distress  Bilateral bronchovascular breath sounds with no crackles or rhonchi  Chest wall: No tenderness  Cardiovascular: Normal PMI  No thrills  Regular rate and rhythm  S1 and S2 normal  No murmur, rub or gallop  Gastrointestinal: Abdomen soft, nontender  No guarding or rigidity   Liver and spleen not palpable  Bowel sounds present  Neurologic: Cranial nerves II-XII intact  Cortical functions normal  Motor system - Reflexes 2+ and symmetrical  Sensations normal  Musculoskeletal: Gait normal  No joint tenderness  Integumentary: Skin normal with no rash or lesions  Lymphatic: No palpable lymph nodes in neck, axilla or groin  Extremities: No clubbing, cyanosis, edema or varicosities  Psychological: Judgement and insight normal  Mood and affect normal      Laboratory Results:  CBC with diff:   Lab Results   Component Value Date    WBC 3 50 (L) 07/16/2021    RBC 4 58 07/16/2021    HGB 15 3 07/16/2021    HCT 43 1 07/16/2021    MCV 94 07/16/2021    MCH 33 4 07/16/2021    RDW 13 1 07/16/2021     07/16/2021       CMP:  Lab Results   Component Value Date    CREATININE 1 21 07/16/2021    BUN 8 07/16/2021    K 3 5 07/16/2021     07/16/2021    CO2 25 07/16/2021    ALKPHOS 82 07/16/2021    ALT 41 07/16/2021    AST 29 07/16/2021       No results found for: HGBA1C, MG, PHOS    No results found for: TROPONINI, CKMB, CKTOTAL    Lipid Profile:   No results found for: CHOL  Lab Results   Component Value Date    HDL 75 07/16/2021     Lab Results   Component Value Date    LDLCALC 176 (H) 07/16/2021     Lab Results   Component Value Date    TRIG 144 07/16/2021       Imaging Results:  Colonoscopy  Narrative:  [facility]  [facility]  [phone]    DATE OF SERVICE:  4/19/21    PHYSICIAN(S):  Deb Higgins MD - Attending Physician    INDICATION:  Screening for colon cancer  Colonoscopy performed for a screening indication  POST-OP DIAGNOSIS:  See the impression below  HISTORY:  Prior colonoscopy: No prior colonoscopy  PREPROCEDURE:  Informed consent was obtained for the procedure, including sedation  Risks   including but not limited to bleeding, infection, perforation, adverse   drug reaction and aspiration were explained in detail   Also explained   about less than 100% sensitivity with the exam and other alternatives  The   patient was placed in the left lateral decubitus position  DETAILS OF PROCEDURE:  Patient was taken to the procedure room where a time out was performed to   confirm correct patient and correct procedure  The patient underwent   monitored anesthesia care, which was administered by an anesthesia   professional  The patient's blood pressure, heart rate, level of   consciousness, oxygen and respirations were monitored throughout the   procedure  A digital rectal exam was performed  The scope was introduced   through the anus and advanced to the cecum  Photodocumentation was   obtained at the ileocecal valve, appendiceal orifice and retroflexed view   of the rectum  Retroflexion was performed in the rectum  The quality of   bowel preparation was evaluated using the [facility] Bowel Preparation Scale   with scores of: right colon = 2, transverse colon = 2, left colon = 2  The   total BBPS score was 6  Bowel prep was adequate  The patient's estimated   blood loss was minimal (<5 mL)  The procedure was not difficult  The   patient tolerated the procedure well  There were no apparent   complications  ANESTHESIA INFORMATION:  ASA: II  Anesthesia Type: IV Sedation with Anesthesia    FINDINGS:  Two 1 mm sessile polyps in the rectosigmoid; performed complete en bloc   removal by cold forceps biopsy  One 5 mm sessile polyp in the cecum; completely removed en bloc by cold   snare and retrieved specimen  Few small mild diverticula in the sigmoid colon  All observed locations appeared normal, including the ascending colon,   hepatic flexure, transverse colon, splenic flexure, descending colon and   rectum      EVENTS:  Procedure Events   Event Event Time   ENDO CECUM REACHED 4/19/2021  8:03 AM   ENDO SCOPE OUT TIME 4/19/2021  8:09 AM     SPECIMENS:  ID Type Source Tests Collected by Time Destination   1 : recto-sigmoid  x3 Tissue Polyp, Colorectal TISSUE EXAM Claudell Mom, MD 4/19/2021  8:07 AM    2 : cecum Tissue Polyp, Colorectal TISSUE EXAM Riki Goetz MD   4/19/2021  8:08 AM         Impression: 1  Cecal polyp status post cold snare polypectomy  2  3 diminutive rectosigmoid polyps status post excisional biopsy removal   3   Mild sigmoid diverticulosis    RECOMMENDATION:  Repeat in 5 years due to a personal history of colon polyps  Follow-up biopsy results in 1 week    Riki Goetz MD       Health Maintenance:  Health Maintenance   Topic Date Due    Pneumococcal Vaccine: Pediatrics (0 to 5 Years) and At-Risk Patients (6 to 59 Years) (1 of 2 - PPSV23) Never done    Annual Physical  Never done    DTaP,Tdap,and Td Vaccines (1 - Tdap) 06/16/1985    Influenza Vaccine (1) 09/01/2021    Depression Screening PHQ  01/11/2022    BMI: Followup Plan  01/11/2022    HIV Screening  08/21/2021 (Originally 6/16/1980)    Hepatitis C Screening  07/21/2022 (Originally 1965)    BMI: Adult  04/19/2022    Colorectal Cancer Screening  04/19/2026    COVID-19 Vaccine  Completed    HIB Vaccine  Aged Out    Hepatitis B Vaccine  Aged Out    IPV Vaccine  Aged Out    Hepatitis A Vaccine  Aged Out    Meningococcal ACWY Vaccine  Aged Out    HPV Vaccine  Aged Out     Immunization History   Administered Date(s) Administered    Hep B, adult 11/08/2004    SARS-CoV-2 / COVID-19 mRNA IM (Pfizer-BioNTech) 05/01/2021, 05/28/2021    Td (adult), Unspecified 07/17/2003         William Sanabria MD  7/21/2021,3:00 PM

## 2021-07-26 ENCOUNTER — TELEPHONE (OUTPATIENT)
Dept: INTERNAL MEDICINE CLINIC | Facility: CLINIC | Age: 56
End: 2021-07-26

## 2021-07-26 NOTE — TELEPHONE ENCOUNTER
Dr gave referral for neurology when he called them he was told that their protocol is for him to see spine & pain management first  which doesn't make sense to him    he doesn't want to just take pain medication    wants dx testing done to see what may be the cause of the problem    to him the last resort would be pain med    what does  think about this ? ??

## 2021-08-02 NOTE — TELEPHONE ENCOUNTER
Pain management usually takes care of neck and back issues  They will do the evaluation and appropriate imaging before deciding on the treatment  They will not give pain medicines usually

## 2021-08-10 ENCOUNTER — CONSULT (OUTPATIENT)
Dept: OBGYN CLINIC | Facility: CLINIC | Age: 56
End: 2021-08-10
Payer: COMMERCIAL

## 2021-08-10 ENCOUNTER — APPOINTMENT (OUTPATIENT)
Dept: RADIOLOGY | Facility: CLINIC | Age: 56
End: 2021-08-10
Payer: COMMERCIAL

## 2021-08-10 VITALS — WEIGHT: 167.2 LBS | HEIGHT: 67 IN | BODY MASS INDEX: 26.24 KG/M2

## 2021-08-10 DIAGNOSIS — M79.642 BILATERAL HAND PAIN: Primary | ICD-10-CM

## 2021-08-10 DIAGNOSIS — M79.641 BILATERAL HAND PAIN: Primary | ICD-10-CM

## 2021-08-10 DIAGNOSIS — M79.641 BILATERAL HAND PAIN: ICD-10-CM

## 2021-08-10 DIAGNOSIS — S63.639A SPRAIN OF PROXIMAL INTERPHALANGEAL (PIP) JOINT OF FINGER: ICD-10-CM

## 2021-08-10 DIAGNOSIS — M79.641 PAIN OF RIGHT HAND: ICD-10-CM

## 2021-08-10 DIAGNOSIS — M79.642 BILATERAL HAND PAIN: ICD-10-CM

## 2021-08-10 PROCEDURE — 3008F BODY MASS INDEX DOCD: CPT | Performed by: ORTHOPAEDIC SURGERY

## 2021-08-10 PROCEDURE — 1036F TOBACCO NON-USER: CPT | Performed by: ORTHOPAEDIC SURGERY

## 2021-08-10 PROCEDURE — 99204 OFFICE O/P NEW MOD 45 MIN: CPT | Performed by: ORTHOPAEDIC SURGERY

## 2021-08-10 PROCEDURE — 73130 X-RAY EXAM OF HAND: CPT

## 2021-08-10 NOTE — PROGRESS NOTES
CHIEF COMPLAINT:  Chief Complaint   Patient presents with    Right Hand - Pain       SUBJECTIVE:  Ambar Taylor is a 61y o  year old male who presents right hand pain  Patient states back in February he slipped when there was place any landed on his outstretched hand  He thinks that he may have jammed his finger  He notes swelling over the radial aspect of the PIP joint of the right ring finger  He states he also notes some stiffness with making a full fist   He states he has been working on his range of motion however he still notes stiffness while doing so  He is also complaining of overall joint achiness  He also notes random bruising into the palmar aspect of his hand as well as the digits          PAST MEDICAL HISTORY:  Past Medical History:   Diagnosis Date    Allergic conjunctivitis     Resolved 12/18/2017        PAST SURGICAL HISTORY:  Past Surgical History:   Procedure Laterality Date    COLONOSCOPY  2021    FOOT ARTHRODESIS         FAMILY HISTORY:  Family History   Problem Relation Age of Onset    Hyperlipidemia Mother     Hypertension Mother        SOCIAL HISTORY:  Social History     Tobacco Use    Smoking status: Never Smoker    Smokeless tobacco: Never Used   Vaping Use    Vaping Use: Never used   Substance Use Topics    Alcohol use: Yes     Comment: Social     Drug use: Never       MEDICATIONS:    Current Outpatient Medications:     albuterol (PROVENTIL HFA,VENTOLIN HFA) 90 mcg/act inhaler, Inhale 2 puffs every 6 (six) hours as needed for wheezing or shortness of breath, Disp: 1 Inhaler, Rfl: 5    amLODIPine (NORVASC) 10 mg tablet, Take 1 tablet (10 mg total) by mouth daily, Disp: 90 tablet, Rfl: 1    atorvastatin (LIPITOR) 20 mg tablet, Take 1 tablet (20 mg total) by mouth daily, Disp: 90 tablet, Rfl: 3    ALLERGIES:  Allergies   Allergen Reactions    Cat Hair Extract     Dog Epithelium     Dust Mite Extract     Molds & Smuts     Other     Pollen Extract     Aminta Longo Grass Pollen Allergen        REVIEW OF SYSTEMS:  Review of Systems  ROS:   General: no fever, no chills  HEENT:  No loss of hearing or eyesight problems  Eyes:  No red eyes  Respiratory:  No coughing, shortness of breath or wheezing  Cardiovascular:  No chest pain, no palpitations  GI:  Abdomen soft nontender, denies nausea  Endocrine:  No muscle weakness, no frequent urination, no excessive thirst  Urinary:  No dysuria, no incontinence  Musculoskeletal: see HPI and PE  SKIN:  No skin rash, no dry skin  Neurological:  No headaches, no confusion  Psychiatric:  No suicide thoughts, no anxiety, no depression  Review of all other systems is negative    VITALS:  There were no vitals filed for this visit      LABS:  HgA1c: No results found for: HGBA1C  BMP:   Lab Results   Component Value Date    CALCIUM 9 6 07/16/2021    K 3 5 07/16/2021    CO2 25 07/16/2021     07/16/2021    BUN 8 07/16/2021    CREATININE 1 21 07/16/2021       _____________________________________________________  PHYSICAL EXAMINATION:  General: well developed and well nourished, alert, oriented times 3 and appears comfortable  Psychiatric: Normal  HEENT: Trachea Midline, No torticollis  Pulmonary: No audible wheezing or strider  Cardiovascular: No discernable arrhythmia   Skin: No masses, erythema, lacerations, fluctation, ulcerations  Neurovascular: Sensation Intact to the Median, Ulnar, Radial Nerve, Motor Intact to the Median, Ulnar, Radial Nerve and Pulses Intact    MUSCULOSKELETAL EXAMINATION:  Right ring finger   Mild swelling noted over the radial aspect of the PIP joint, no erythema or ecchymosis noted  No tenderness to palpation over the PIP joint   He has stiffness while trying to make a full composite fist however is able to do so   PIP and D IP joint are stable with ligamentous testing    ___________________________________________________  STUDIES REVIEWED:  Images obtained today of the Bilateral hands 3 views demonstrate No acute fractures or dislocations noted  PROCEDURES PERFORMED:  Procedures  No Procedures performed today    _____________________________________________________  ASSESSMENT/PLAN:      Sprain of proximal interphalangeal (PIP) joint of finger  - patient was advised to continue to work on his range of motion  -she can use heat and massage  -he will follow up with us as needed    Hand bruising  - patient was advised to follow up with his PCP and discuss PT/PT INR blood work  - he will follow-up with us as needed      Follow Up:  Return if symptoms worsen or fail to improve  Work/school status:  No restrictions    To Do Next Visit:  Re-evaluation of current issue      Scribe Attestation    I,:  Hetal Sams PA-C am acting as a scribe while in the presence of the attending physician :       I,:  Nicolle Dickinson MD personally performed the services described in this documentation    as scribed in my presence :           Portions of the record may have been created with voice recognition software  Occasional wrong word or "sound a like" substitutions may have occurred due to the inherent limitations of voice recognition software  Read the chart carefully and recognize, using context, where substitutions have occurred

## 2021-08-16 ENCOUNTER — CONSULT (OUTPATIENT)
Dept: PAIN MEDICINE | Facility: CLINIC | Age: 56
End: 2021-08-16
Payer: COMMERCIAL

## 2021-08-16 VITALS
HEIGHT: 67 IN | DIASTOLIC BLOOD PRESSURE: 87 MMHG | HEART RATE: 96 BPM | WEIGHT: 165 LBS | BODY MASS INDEX: 25.9 KG/M2 | SYSTOLIC BLOOD PRESSURE: 137 MMHG

## 2021-08-16 DIAGNOSIS — M79.10 TRIGGER POINT: Primary | ICD-10-CM

## 2021-08-16 DIAGNOSIS — M79.18 MYOFASCIAL PAIN SYNDROME: ICD-10-CM

## 2021-08-16 DIAGNOSIS — M54.12 CERVICAL RADICULOPATHY: ICD-10-CM

## 2021-08-16 PROCEDURE — 1036F TOBACCO NON-USER: CPT | Performed by: STUDENT IN AN ORGANIZED HEALTH CARE EDUCATION/TRAINING PROGRAM

## 2021-08-16 PROCEDURE — 3008F BODY MASS INDEX DOCD: CPT | Performed by: STUDENT IN AN ORGANIZED HEALTH CARE EDUCATION/TRAINING PROGRAM

## 2021-08-16 PROCEDURE — 99204 OFFICE O/P NEW MOD 45 MIN: CPT | Performed by: STUDENT IN AN ORGANIZED HEALTH CARE EDUCATION/TRAINING PROGRAM

## 2021-08-16 NOTE — PROGRESS NOTES
Assessment  1  Trigger point    2  Cervical radiculopathy    3  Myofascial pain syndrome        Plan    This is a 50-year-old male who presents to our office with chief complaint left posterior shoulder pain, left scapular pain along with pain radiating into the upper left arm with numbness and tingling extending into the primarily  4th digit on the left  On exam, he has notable trigger points in the left trapezius and left rhomboid regions  When pressing upon these areas he has referred pain into the left upper arm  Therefore, peers to have a myofascial component to his symptoms  For this we discussed  Physical therapy and  Trigger point injection  Given the patient's symptoms, examination findings and imaging results noted above, I discussed the utility of proceeding with a trigger point injection   Under ultrasound guidance  Using an anatomical model, the procedure, as well as its potential risks, benefits, and reasonable alternatives were discussed in detail  Discussed risks of the procedure included but are not limited to bleeding, infection, allergic reaction, nerve damage, hematoma fomation, abscess formation, failure of the pain to improve and potential worsening of the pain  Since Mr Wendy Haley has failed at least 6 weeks of conservative measures including over-the-counter pain medications, prescription medications and chiropractic therapy it is reasonable to proceed with the above injection  The patient verbalized understanding to the potential risks, benefits, and reasonable alternatives to the above injection and wishes to proceed  His response will help to further determine a treatment plan  In regards to cervical radiculopathy symptoms, he does not have focal weakness on exam, but does have some noted hyporeflexia of the left tricep  If no improvement with physical therapy and trigger point injections, next step will be to order cervical MRI    Therefore, we we will see him back in 6-8 weeks following physical therapy  [place] Prescription Drug Monitoring Program report was reviewed and was appropriate     My impressions and treatment recommendations were discussed in detail with the patient who verbalized understanding and had no further questions  Discharge instructions were provided  I personally saw and examined the patient and I agree with the above discussed plan of care  Orders Placed This Encounter   Procedures    FL spine and pain procedure     Standing Status:   Future     Standing Expiration Date:   8/16/2025     Order Specific Question:   Reason for Exam:     Answer:   left trapezius and left rhomboid ultrasound TPI     Order Specific Question:   Anticoagulant hold needed? Answer:   No    Ambulatory referral to Physical Therapy     Standing Status:   Future     Standing Expiration Date:   8/16/2022     Referral Priority:   Routine     Referral Type:   Physical Therapy     Referral Reason:   Specialty Services Required     Requested Specialty:   Physical Therapy     Number of Visits Requested:   1     Expiration Date:   8/16/2022     No orders of the defined types were placed in this encounter  History of Present Illness    Referring Provider: Venus Butts MD    Trice Becker is a 61 y o  male who presents with a chief complaint of  Neck and shoulder pain  This is a chronic issue for over 2 years  Patient attributes his symptoms to a back injury  Reports he slipped on ice but did not fall  Every since then he had pain primarily left shoulder blade area  Not in the neck  Radiates into left posterior shoulder and upper arm up to the elbow  When he leaves his arm abducted in the air, his left hand goes numb, mostly in the left pinky  Patient's occupation is a       Reports pain began after this injury  Over the past month, intensity the pain has been moderate to severe  Reports having pain intermittently    During the past month, pain has been worse at all times of the day  Reports pain is focused on triceps muscle today  No PAIN in arm past elbow but does get numbness and tingling  Describes symptoms as cramping, numbness,  Pins and needles, dull/aching, and "muscle pain"     reports weakness in the upper extremities and dropping objects  Currently uses a cane to ambulate  No recent pertinent imaging  Past medical history includes asthma  Previous pain treatments include chiropractic manipulation with moderate relief  No recent physical therapy  Does not smoke tobacco   Does smoke marijuana and drink alcohol moderately  Does not take any blood thinners  Not allergic to latex or contrast dye        In the past has tried muscle relaxer  I have personally reviewed and/or updated the patient's past medical history, past surgical history, family history, social history, current medications, allergies, and vital signs today  Review of Systems   Constitutional: Negative for fever and unexpected weight change  HENT: Negative for trouble swallowing  Eyes: Negative for visual disturbance  Respiratory: Negative for shortness of breath and wheezing  Cardiovascular: Negative for chest pain and palpitations  Gastrointestinal: Negative for constipation, diarrhea, nausea and vomiting  Endocrine: Negative for cold intolerance, heat intolerance and polydipsia  Genitourinary: Negative for difficulty urinating and frequency  Musculoskeletal: Positive for myalgias  Negative for arthralgias, gait problem and joint swelling  Skin: Negative for rash  Neurological: Positive for numbness  Negative for dizziness, seizures, syncope, weakness and headaches  Hematological: Does not bruise/bleed easily  Psychiatric/Behavioral: Negative for dysphoric mood  All other systems reviewed and are negative        Patient Active Problem List   Diagnosis    Allergic rhinitis    Benign essential hypertension    Dermatitis  Cervical radiculopathy    Lumbar radiculopathy    WBC disease    Mild persistent asthma without complication    Overweight    Lateral epicondylitis of right elbow    Asthma    Hyperglycemia    Mixed hyperlipidemia    Acquired hypothyroidism    Pain of right hand    Right upper quadrant abdominal pain       Past Medical History:   Diagnosis Date    Allergic conjunctivitis     Resolved 12/18/2017        Past Surgical History:   Procedure Laterality Date    COLONOSCOPY  2021    FOOT ARTHRODESIS         Family History   Problem Relation Age of Onset    Hyperlipidemia Mother     Hypertension Mother        Social History     Occupational History    Not on file   Tobacco Use    Smoking status: Never Smoker    Smokeless tobacco: Never Used   Vaping Use    Vaping Use: Never used   Substance and Sexual Activity    Alcohol use: Yes     Comment: Social     Drug use: Yes     Types: Marijuana     Comment: socially    Sexual activity: Yes     Partners: Female       Current Outpatient Medications on File Prior to Visit   Medication Sig    albuterol (PROVENTIL HFA,VENTOLIN HFA) 90 mcg/act inhaler Inhale 2 puffs every 6 (six) hours as needed for wheezing or shortness of breath    amLODIPine (NORVASC) 10 mg tablet Take 1 tablet (10 mg total) by mouth daily    atorvastatin (LIPITOR) 20 mg tablet Take 1 tablet (20 mg total) by mouth daily     No current facility-administered medications on file prior to visit  Allergies   Allergen Reactions    Cat Hair Extract     Dog Epithelium     Dust Mite Extract     Molds & Smuts     Other     Pollen Extract     Dario Grass Pollen Allergen        Physical Exam    /87   Pulse 96   Ht 5' 7" (1 702 m)   Wt 74 8 kg (165 lb)   BMI 25 84 kg/m²     Constitutional: normal, well developed, well nourished, alert, in no distress and non-toxic and no overt pain behavior    Eyes: anicteric  HEENT: grossly intact  Neck: supple, symmetric, trachea midline and no masses   Pulmonary:even and unlabored  Cardiovascular:No edema or pitting edema present  Skin:Normal without rashes or lesions and well hydrated  Psychiatric:Mood and affect appropriate  Neurologic:Cranial Nerves II-XII grossly intact  Musculoskeletal:normal     Cervical Spine Exam    Appearance:  Normal lordosis  Palpation/Tenderness:  no tenderness or spasm  Sensory:  no sensory deficits noted  Range of Motion:  Flexion:  No limitation  without pain  Extension:  No limitation  with pain  Lateral Flexion - Left:  No limitation  without pain  Lateral Flexion - Right:  No limitation  with pain  Rotation - Left:  No limitation  without pain  Rotation - Right:  No limitation  with pain   Pain on left trapezius with right lateral flexion and roation  Motor Strength:  Left Arm Flexion  5/5  Left Arm Extension  5/5  Right Arm Flexion  5/5  Right Arm Extension  5/5  Left Wrist Flexion  5/5  Left Wrist Extension  5/5  Left Finger Abduction  5/5  Right Finger Abduction  5/5  Left Pincer Grasp  5/5  Right Pincer Grasp  5/5  Left    5/5  Right   5/5  Reflexes:  Left Biceps:  3+   Right Biceps:  3+   Left Brachioradialis:  3+   Right Brachioradialis:  3+   Left Triceps:  1+   Right Triceps:  2+   Special Tests:  Left Spurlings:  negative  Right Spurlings  negative    DIAGNOSTIC IMAGING AND TEST RESULTS:  No recent pertintent imaging

## 2021-08-16 NOTE — PATIENT INSTRUCTIONS
Trigger Point Injection   AMBULATORY CARE:   A trigger point injection  is used to relax a muscle knot  This helps relieve pain  You may be able to have more than one trigger point treated during a session  How to prepare for a trigger point injection:   · Your healthcare provider will tell you how to prepare  Arrange to have someone drive you home after the injection  · Tell your provider about all medicines you take, including pain medicine, blood thinners, and muscle relaxers  He or she will tell you if you need to stop any medicine for the injection, and when to stop  He or she will tell you which medicines to take or not take on the day of the injection  · Tell your provider about all your allergies, including to any pain medicine  What will happen during a trigger point injection:   · You may be sitting or lying, depending on where the trigger point is located  Your healthcare provider will feel for a knot in the muscle  He or she may joey your skin over the knot  · Your provider will put a needle through your skin and into the trigger point  Saline (salt solution), pain relievers, or other medicines may be pushed through the needle into the trigger point  Your provider may use only a dry needle (no medicine)  He or she will pull the needle almost all the way out and then push it in again  He or she will repeat this several times until the muscle stops twitching or feels relaxed  · Your provider will remove the needle and stretch the muscle area  He or she may apply pressure to the area for 2 minutes  A bandage will be put over the injection site to prevent bleeding or an infection  What to expect after a trigger point injection:   · You may feel pain relief right away  It is normal for some pain to start again 2 hours later  An ice pack or over-the-counter pain medicine can help lower the pain  · You may feel sore in the injection site for a few days   If you need another injection in the same area, wait until the area is not sore  · Your healthcare provider may give you specific activity instructions to follow at home or recommend physical therapy  In general, you should try to stay active  Avoid strenuous activity for the first 3 or 4 days after the injection  · Do not have more injections if you still have trigger point pain after 2 or 3 injections  Risks of a trigger point injection:  You may have a severe allergic reaction to pain medicine injected  The injection may be painful, or you may be sore where you got the injection  You may bleed, bruise, or develop an infection in the injection area  The injection may cause you to feel faint  Rarely, the needle may cause muscle or blood vessel damage or your lung may collapse if you get the injection near your chest   Call your local emergency number (911 in the [address] [place] [address],[address]), or have someone call if:   · Your mouth and throat are swollen  · You are wheezing or have trouble breathing  · You have chest pain or your heart is beating faster than usual     · You feel like you are going to faint  When should I seek immediate care? · Your face is red or swollen  · You have hives that spread over your body  · You feel weak or dizzy  Call your doctor or pain specialist if:   · You have a fever within 1 week of the injection  · You have redness or swelling within 1 week of the injection  · You have new or worsening pain near the injection site  · You have questions or concerns about your condition or care  Self-care:   · Stay active after you have trigger point injections  Gently move your joints through their full range of motion during the first week  Avoid strenuous activity for 3 or 4 days  · Do regular stretches of the trigger point muscle  Place gentle pressure on the trigger point, and then stretch the muscle  Ask your healthcare provider for more information about how to stretch and apply pressure      · Apply ice to the injection site  Use an ice pack, or put ice in a plastic bag  Cover the bag with a towel before you apply it  Apply ice for 15 to 20 minutes every hour, or as directed  · Apply heat to trigger point sites  Heat can help relax muscles and relieve trigger point pain  Use a heat pack or a heating pad set on low  Apply heat for 15 minutes every hour, or as directed  Follow up with your doctor or pain specialist as directed:  Write down your questions so you remember to ask them during your visits  © Copyright [employer] 2021 Information is for End User's use only and may not be sold, redistributed or otherwise used for commercial purposes  All illustrations and images included in CareNotes® are the copyrighted property of A D A M , Inc  or [facility] Radha Tucker  The above information is an  only  It is not intended as medical advice for individual conditions or treatments  Talk to your doctor, nurse or pharmacist before following any medical regimen to see if it is safe and effective for you

## 2021-08-18 ENCOUNTER — TELEPHONE (OUTPATIENT)
Dept: RADIOLOGY | Facility: CLINIC | Age: 56
End: 2021-08-18

## 2021-08-18 NOTE — TELEPHONE ENCOUNTER
Please schedule left trapezius and left rhomboid ultrasound TPI w/Dr Isamar Kent BS lidia through [facility],  auth req for [id number] and [id number], 8/18/21 1155 am

## 2021-08-23 ENCOUNTER — EVALUATION (OUTPATIENT)
Dept: PHYSICAL THERAPY | Facility: CLINIC | Age: 56
End: 2021-08-23
Payer: COMMERCIAL

## 2021-08-23 DIAGNOSIS — M79.18 MYOFASCIAL PAIN SYNDROME: ICD-10-CM

## 2021-08-23 DIAGNOSIS — M54.12 CERVICAL RADICULOPATHY: ICD-10-CM

## 2021-08-23 PROCEDURE — 97140 MANUAL THERAPY 1/> REGIONS: CPT | Performed by: PHYSICAL THERAPIST

## 2021-08-23 PROCEDURE — 97161 PT EVAL LOW COMPLEX 20 MIN: CPT | Performed by: PHYSICAL THERAPIST

## 2021-08-23 NOTE — PROGRESS NOTES
PT Evaluation     Today's date: 2021  Patient name: Marylou Motley  : 1965  MRN: [id number]  Referring provider: Shannon Millan MD  Dx:   Encounter Diagnosis     ICD-10-CM    1  Myofascial pain syndrome  M79 18 Ambulatory referral to Physical Therapy   2  Cervical radiculopathy  M54 12 Ambulatory referral to Physical Therapy                  Assessment  Assessment details: Marylou Motlye is a 61 y o  male presenting as an outpatient to [facility] [place] [facility] PT w/ c/o L c/s pain w/ LUE radicular symptoms  In addition to pain, pt presents w/ impaired posture, hypertonicity of surrounding musculature, s/s of 1st rib impingement syndrome, decreased ROM, and decreased strength significantly limiting pt's functional ability  Pt will benefit from skilled PT services to address the above deficits in order to max function to allow pt to achieve goals in PT  Thank you for the referral of this pt  Impairments: abnormal muscle tone, abnormal or restricted ROM, activity intolerance, impaired physical strength, lacks appropriate home exercise program, pain with function and poor posture   Barriers to therapy: High deductible may limit frequency/duration of tx  Understanding of Dx/Px/POC: good   Prognosis: fair    Goals  ST  Pain decreased by 25% in 4-6 weeks  2  ROM increased by 25% in 4-6 weeks  3  Strength increased by 1/2 to 1 muscle grade in all deficient muscle groups in 4-6 weeks  LT  Decrease pain to 1-2/10 at worst by d/c   2  Increase ROM to [facility] for all deficient movements by d/c   3  Strength increased to 5 for all deficient muscle groups by d/c   4  IADL performance increased to max function by d/c   5  Recreational/work performance increased to max function by d/c   6  FOTO increased to 65 by d/c        Plan  Planned modality interventions: cryotherapy and TENS  Other planned modality interventions: other modalities PRN  Planned therapy interventions: ADL retraining, IADL retraining, flexibility, functional ROM exercises, graded exercise, home exercise program, manual therapy, joint mobilization, neuromuscular re-education, patient education, postural training, strengthening, therapeutic exercise and therapeutic activities  Other planned therapy interventions: other interventions PRN  Frequency: 1-2x/week  Duration in weeks: 6  Plan of Care beginning date: 2021  Plan of Care expiration date: 10/4/2021  Treatment plan discussed with: patient        Subjective Evaluation    History of Present Illness  Mechanism of injury:  Pt reports to IE w/ c/o L c/s and L periscap/LUE radicular symptoms  Pt reports that pain began approx 2 years ago, he was walking down an icy stairwell, but slipped at the bottom of the stairs  He reports that his arms "windmilled" prior to falling and ever since then, he started having pain in scapular region which traveled into LUE  He reports that at that time, he did not have insurance and was placed on muscle relaxers which seemed to help decrease pain  Pt also went to a chiropractor/massage therapist that helped  However, he reports having intermittent "flare ups" since onset of symptoms  He reports pain recently travels along L lateral UE to elbow and into pec  Pt reports intermittent parasthesias/numbness into 4th/5th digits  Pt reports that he feels weakness in LUE on "bad days"  Pt scheduled for epidural on   Pain  Current pain rating: 3  At worst pain ratin  Location: L c/s w/ LUE radicular symptoms  Alleviating factors: OTC Advil PRN  Exacerbated by: c/s in flexed position especially for extended periods of time, LUE outstretched/unsupported for extended periods of time, OH lifting, c/s rotation, intermittently OH elevation, intermittently sleeping at night      Social Support  Lives with: significant other    Employment status: working ()  Hand dominance: right (However, he uses LUE to stabilize often for his job)    Patient Goals  Patient goals for therapy: decreased pain, increased strength and increased motion          Objective     Active Range of Motion   Cervical/Thoracic Spine       Cervical    Subcranial retraction:  WFL and with pain   Flexion: 72 degrees   Extension: 55 degrees     with pain  Left lateral flexion: 22 degrees     with pain  Right lateral flexion: 34 degrees      Left rotation: 54 degrees with pain  Right rotation: 68 degrees    with pain    Thoracic    Flexion:  Restriction level: minimal  Extension:  with pain Restriction level: moderate  Left rotation:  Restriction level: minimal  Right rotation:  Restriction level: minimal    Additional Active Range of Motion Details  Gross Shoulder AROM (L/R):   Flexion: WFL/WFL  Abduction: WFL/WFL  IR: WFL *pain/WFL  ER: WFL *Pain/WFL      Strength/Myotome Testing     Left Shoulder     Planes of Motion   Flexion: 4   Abduction: 4+ (*L scapular pain)   External rotation at 0°: 5     Right Shoulder     Planes of Motion   Flexion: 4+   Abduction: 4+   External rotation at 0°: 5     Left Elbow   Flexion: 5  Extension: 4+    Right Elbow   Flexion: 5  Extension: 4+    Left Wrist/Hand   Wrist extension: 5  Wrist flexion: 5  Thumb extension: 5    Right Wrist/Hand   Wrist extension: 5  Wrist flexion: 5  Thumb extension: 5    Additional Strength Details   Strength (assessed via dynamometer w/ 90 deg elbow flexion- L/R): 88 lbs/80 lbs      General Comments:      Cervical/Thoracic Comments  Observation/Posture:  Pt sits w/ PPT and fwd, rounded shoulders w/ fwd head    DTR (L/R):  Triceps: 2+/2+  Biceps: 2+/2+    Palpation: Hypertonicity/Tenderness w/ palpation to L c/s psp, LUT, periscapular area, L subscap, L pec    c/s special tests:   sharp-madeline: negative  vertebral artery: negative  spurlings: negative   Traction: neagtive  Seymour's: positive L sided restriction/*pain    Spring Testing: positive tenderness/hymobility t/o mid to upper t/s       EPOC: 10/4/2021  Precautions: HTN-takes meds; asthma- has inhaler  Co-morbidities: thyroid d/o    Daily Treatment Diary     Manual  8/23            STM/TPR to L c/s psp/LUT/L periscap/L subscap/L pec NV            L 1st rib MWM RB                         Total Time 8'                Exercise Diary              Neuro Re-ed             UBE-retro             scap squeeze             Posterior shoulder rolls             Chin tucks- supine w/ towel roll             Prone scap squeeze             Prone rows             LPD/rows             TB resisted UT strengthening             Supine tB resisted ER/horizontal abd             THEREX             Pt ed 5' HEP instruct/andout            C/s rotation- supine             UT stretch-gentle             T/s extensions over foam roller             T/s rotations- s/l             Cat/cow stretch             pec stretch             S/l subscap stretch                                                                                                                                      Modalities  8/23            CP  Home PRN

## 2021-08-25 ENCOUNTER — PROCEDURE VISIT (OUTPATIENT)
Dept: PAIN MEDICINE | Facility: CLINIC | Age: 56
End: 2021-08-25
Payer: COMMERCIAL

## 2021-08-25 DIAGNOSIS — M79.18 MYOFASCIAL PAIN SYNDROME: Primary | ICD-10-CM

## 2021-08-25 PROCEDURE — 76942 ECHO GUIDE FOR BIOPSY: CPT | Performed by: STUDENT IN AN ORGANIZED HEALTH CARE EDUCATION/TRAINING PROGRAM

## 2021-08-25 RX ORDER — METHYLPREDNISOLONE ACETATE 40 MG/ML
40 INJECTION, SUSPENSION INTRA-ARTICULAR; INTRALESIONAL; INTRAMUSCULAR; SOFT TISSUE ONCE
Status: COMPLETED | OUTPATIENT
Start: 2021-08-25 | End: 2021-08-26

## 2021-08-25 RX ORDER — BUPIVACAINE HYDROCHLORIDE 2.5 MG/ML
2 INJECTION, SOLUTION EPIDURAL; INFILTRATION; INTRACAUDAL ONCE
Status: COMPLETED | OUTPATIENT
Start: 2021-08-25 | End: 2021-08-26

## 2021-08-25 NOTE — PROGRESS NOTES
Inj Trigger Point Single/Multiple     Date/Time 8/25/2021 11:55 AM     Performed by  Nathaniel Osei MD     Authorized by Nathaniel Osei MD      Universal Protocol   Consent: Written consent obtained  Risks and benefits: risks, benefits and alternatives were discussed  Consent given by: patient  Time out: Immediately prior to procedure a "time out" was called to verify the correct patient, procedure, equipment, support staff and site/side marked as required  Timeout called at: 8/25/2021 11:55 AM   Patient understanding: patient states understanding of the procedure being performed  Patient consent: the patient's understanding of the procedure matches consent given  Procedure consent: procedure consent matches procedure scheduled  Relevant documents: relevant documents present and verified  Test results: test results available and properly labeled  Site marked: the operative site was marked  Required items: required blood products, implants, devices, and special equipment available  Patient identity confirmed: verbally with patient        Local anesthesia used: yes      Anesthesia: local infiltration     Anesthesia   Local anesthesia used: yes  Local Anesthetic: bupivacaine 0 25% without epinephrine  Anesthetic total: 2 mL     Sedation   Patient sedated: no        Specimen: no    Culture: no   Procedure Details   Procedure Notes: PROCEDURE:  Trigger point injection x 3 with local anesthetic and steroid in the left trapezius and left rhomboid muscle groups under ultrasound guidance  ATTENDING PHYSICIAN:  MOLLY Erwin  PREPROCEDURE DIAGNOSIS:  Myofascial pain with identifiable trigger points  POSTPROCEDURE DIAGNOSIS:  Myofascial pain with identifiable trigger points  ANESTHESIA:  Local    ESTIMATED BLOOD LOSS:  Minimal    COMPLICATIONS: None     CONSENT:  Today's procedure, its potential benefits as well as its risks and potential side effects were reviewed    Discussed risks of the procedure include bleeding, infection, nerve irritation or damage, reactions to the medications, failure of the pain to improve and exacerbation of the pain were explained to the patient, who verbalized understanding and who wished to proceed  Informed consent was signed  DESCRIPTION OF THE PROCEDURE:  After informed consent was obtained, the patient was placed in the seated position  3 trigger points were identified via palpation and marked with a surgical skin marker  The skin was prepped with antiseptic in the usual sterile fashion  Strict aseptic technique was utilized throughout the procedure  Using ultrasound guidance a 25 gauge, 2inch needle was then advanced into each identified trigger point  Care was taken to visualize the entirety of the needle throughout the injection  An injectate consisting of 2 ml of 0 25% marcaine with 1 mL of 40mg/mL depo-medrol was slowly injected in divided doses after negative aspiration  The needle was removed with tip intact  The patient tolerated the procedure and hemostasis was maintained  There were no apparent paresthesias or complications  The skin was wiped clean, and a band-aid was placed as appropriate  The patient was monitored for an appropriate period of time following the procedure and remained hemodynamically stable and neurovascularly intact  The patient was ultimately discharged to home with supervision in good condition and instructed to call the office to report the response     Patient tolerance: Patient tolerated the procedure well with no immediate complications

## 2021-08-26 PROCEDURE — 20553 NJX 1/MLT TRIGGER POINTS 3/>: CPT | Performed by: STUDENT IN AN ORGANIZED HEALTH CARE EDUCATION/TRAINING PROGRAM

## 2021-08-26 RX ADMIN — METHYLPREDNISOLONE ACETATE 40 MG: 40 INJECTION, SUSPENSION INTRA-ARTICULAR; INTRALESIONAL; INTRAMUSCULAR; SOFT TISSUE at 09:19

## 2021-08-26 RX ADMIN — BUPIVACAINE HYDROCHLORIDE 2 ML: 2.5 INJECTION, SOLUTION EPIDURAL; INFILTRATION; INTRACAUDAL at 09:18

## 2021-09-02 ENCOUNTER — TELEPHONE (OUTPATIENT)
Dept: PAIN MEDICINE | Facility: CLINIC | Age: 56
End: 2021-09-02

## 2021-09-02 ENCOUNTER — OFFICE VISIT (OUTPATIENT)
Dept: PHYSICAL THERAPY | Facility: CLINIC | Age: 56
End: 2021-09-02
Payer: COMMERCIAL

## 2021-09-02 DIAGNOSIS — M79.18 MYOFASCIAL PAIN SYNDROME: Primary | ICD-10-CM

## 2021-09-02 DIAGNOSIS — M54.12 CERVICAL RADICULOPATHY: ICD-10-CM

## 2021-09-02 PROCEDURE — 97110 THERAPEUTIC EXERCISES: CPT

## 2021-09-02 PROCEDURE — 97140 MANUAL THERAPY 1/> REGIONS: CPT

## 2021-09-02 PROCEDURE — 97112 NEUROMUSCULAR REEDUCATION: CPT

## 2021-09-02 NOTE — TELEPHONE ENCOUNTER
Patient brought into office his procedure Pain Diary  It is scanned in his chart under Media  Rhombic Flap Text: The defect edges were debeveled with a #15 scalpel blade.  Given the location of the defect and the proximity to free margins a rhombic flap was deemed most appropriate.  Using a sterile surgical marker, an appropriate rhombic flap was drawn incorporating the defect.    The area thus outlined was incised deep to adipose tissue with a #15 scalpel blade.  The skin margins were undermined to an appropriate distance in all directions utilizing iris scissors.

## 2021-09-02 NOTE — PROGRESS NOTES
Daily Note     Today's date: 2021  Patient name: Mel Cushing  : 1965  MRN: [id number]  Referring provider: Brii Lin MD  Dx:   Encounter Diagnosis     ICD-10-CM    1  Myofascial pain syndrome  M79 18    2  Cervical radiculopathy  M54 12        Start Time: 1030  Stop Time: 1110  Total time in clinic (min): 40 minutes    Subjective: Patient reports he has been having a lot of soreness in L UT, tricep, and lats area secondary to camping and kayaking this past weekend  Objective: See treatment diary below      Assessment: Tolerated treatment well  Patient demonstrated fatigue post treatment and would benefit from continued skilled PT  Educated patient on stretches that can help reduce tightness  Decreased trigger points in UT post TPR  Patient c/o the same soreness in tricep post session  Cueing for proper technique and posture t/o session  Plan: Continue per plan of care        EPOC: 10/4/2021  Precautions: HTN-takes meds; asthma- has inhaler  Co-morbidities: thyroid d/o    Daily Treatment Diary     Manual             STM/TPR to L c/s psp/LUT/L periscap/L subscap/L pec [facility]           L 1st rib MWM RB                         Total Time 8' 10'               Exercise Diary              Neuro Re-ed             UBE-retro  4'           scap squeeze  10" 10x           Posterior shoulder rolls  20x           Chin tucks- supine w/ towel roll  10" 10x           Prone scap squeeze             Prone rows             LPD/rows             TB resisted UT strengthening             Supine tB resisted ER/horizontal abd             THEREX             Pt ed 5' HEP instruct/andout 5'           C/s rotation- supine  10" 10x           UT stretch-gentle  30" 3x + lavator 30" 3x           T/s extensions over foam roller  10" 10x           T/s rotations- s/l  2" 10x ea           Cat/cow stretch             pec stretch             S/l subscap stretch             Lat stretch over bolster  30" 4x Modalities  8/23            CP  Home PRN

## 2021-09-03 NOTE — TELEPHONE ENCOUNTER
Spoke to patient   he stated the injection only helped that day and he is going to keep up with pt  and call us when ready to [facility] for further options

## 2021-09-08 ENCOUNTER — APPOINTMENT (OUTPATIENT)
Dept: PHYSICAL THERAPY | Facility: CLINIC | Age: 56
End: 2021-09-08
Payer: COMMERCIAL

## 2021-09-15 ENCOUNTER — APPOINTMENT (OUTPATIENT)
Dept: PHYSICAL THERAPY | Facility: CLINIC | Age: 56
End: 2021-09-15
Payer: COMMERCIAL

## 2021-10-15 ENCOUNTER — OFFICE VISIT (OUTPATIENT)
Dept: PAIN MEDICINE | Facility: CLINIC | Age: 56
End: 2021-10-15
Payer: COMMERCIAL

## 2021-10-15 VITALS
HEART RATE: 68 BPM | DIASTOLIC BLOOD PRESSURE: 92 MMHG | WEIGHT: 167 LBS | SYSTOLIC BLOOD PRESSURE: 145 MMHG | BODY MASS INDEX: 26.21 KG/M2 | HEIGHT: 67 IN

## 2021-10-15 DIAGNOSIS — G89.29 CHRONIC SCAPULAR PAIN: ICD-10-CM

## 2021-10-15 DIAGNOSIS — M54.12 CERVICAL RADICULOPATHY: Primary | ICD-10-CM

## 2021-10-15 DIAGNOSIS — M54.14 THORACIC RADICULOPATHY: ICD-10-CM

## 2021-10-15 DIAGNOSIS — M79.18 MYOFASCIAL PAIN SYNDROME: ICD-10-CM

## 2021-10-15 DIAGNOSIS — M89.8X1 CHRONIC SCAPULAR PAIN: ICD-10-CM

## 2021-10-15 PROCEDURE — 99214 OFFICE O/P EST MOD 30 MIN: CPT | Performed by: STUDENT IN AN ORGANIZED HEALTH CARE EDUCATION/TRAINING PROGRAM

## 2021-10-15 PROCEDURE — 3008F BODY MASS INDEX DOCD: CPT | Performed by: STUDENT IN AN ORGANIZED HEALTH CARE EDUCATION/TRAINING PROGRAM

## 2021-10-15 PROCEDURE — 1036F TOBACCO NON-USER: CPT | Performed by: STUDENT IN AN ORGANIZED HEALTH CARE EDUCATION/TRAINING PROGRAM

## 2021-11-03 ENCOUNTER — APPOINTMENT (OUTPATIENT)
Dept: LAB | Facility: CLINIC | Age: 56
End: 2021-11-03
Payer: COMMERCIAL

## 2021-11-03 ENCOUNTER — HOSPITAL ENCOUNTER (OUTPATIENT)
Dept: ULTRASOUND IMAGING | Facility: CLINIC | Age: 56
Discharge: HOME | End: 2021-11-03
Payer: COMMERCIAL

## 2021-11-03 DIAGNOSIS — R73.9 HYPERGLYCEMIA: ICD-10-CM

## 2021-11-03 DIAGNOSIS — E78.2 MIXED HYPERLIPIDEMIA: ICD-10-CM

## 2021-11-03 DIAGNOSIS — R10.11 RIGHT UPPER QUADRANT ABDOMINAL PAIN: ICD-10-CM

## 2021-11-03 DIAGNOSIS — M79.10 MYALGIA: ICD-10-CM

## 2021-11-03 LAB
ALBUMIN SERPL BCP-MCNC: 4.1 G/DL (ref 3.5–5)
ALP SERPL-CCNC: 90 U/L (ref 46–116)
ALT SERPL W P-5'-P-CCNC: 44 U/L (ref 12–78)
ANION GAP SERPL CALCULATED.3IONS-SCNC: 4 MMOL/L (ref 4–13)
AST SERPL W P-5'-P-CCNC: 32 U/L (ref 5–45)
BASOPHILS # BLD AUTO: 0.08 THOUSANDS/ΜL (ref 0–0.1)
BASOPHILS NFR BLD AUTO: 2 % (ref 0–1)
BILIRUB SERPL-MCNC: 1.01 MG/DL (ref 0.2–1)
BUN SERPL-MCNC: 10 MG/DL (ref 5–25)
CALCIUM SERPL-MCNC: 9.2 MG/DL (ref 8.3–10.1)
CHLORIDE SERPL-SCNC: 104 MMOL/L (ref 100–108)
CHOLEST SERPL-MCNC: 226 MG/DL (ref 50–200)
CO2 SERPL-SCNC: 30 MMOL/L (ref 21–32)
CREAT SERPL-MCNC: 1.16 MG/DL (ref 0.6–1.3)
EOSINOPHIL # BLD AUTO: 0.45 THOUSAND/ΜL (ref 0–0.61)
EOSINOPHIL NFR BLD AUTO: 11 % (ref 0–6)
ERYTHROCYTE [DISTWIDTH] IN BLOOD BY AUTOMATED COUNT: 12.5 % (ref 11.6–15.1)
EST. AVERAGE GLUCOSE BLD GHB EST-MCNC: 97 MG/DL
GFR SERPL CREATININE-BSD FRML MDRD: 70 ML/MIN/1.73SQ M
GLUCOSE P FAST SERPL-MCNC: 98 MG/DL (ref 65–99)
HBA1C MFR BLD: 5 %
HCT VFR BLD AUTO: 42.3 % (ref 36.5–49.3)
HDLC SERPL-MCNC: 94 MG/DL
HGB BLD-MCNC: 15.3 G/DL (ref 12–17)
IMM GRANULOCYTES # BLD AUTO: 0.01 THOUSAND/UL (ref 0–0.2)
IMM GRANULOCYTES NFR BLD AUTO: 0 % (ref 0–2)
LDLC SERPL CALC-MCNC: 102 MG/DL (ref 0–100)
LYMPHOCYTES # BLD AUTO: 0.56 THOUSANDS/ΜL (ref 0.6–4.47)
LYMPHOCYTES NFR BLD AUTO: 14 % (ref 14–44)
MCH RBC QN AUTO: 34.5 PG (ref 26.8–34.3)
MCHC RBC AUTO-ENTMCNC: 36.2 G/DL (ref 31.4–37.4)
MCV RBC AUTO: 96 FL (ref 82–98)
MONOCYTES # BLD AUTO: 0.51 THOUSAND/ΜL (ref 0.17–1.22)
MONOCYTES NFR BLD AUTO: 12 % (ref 4–12)
NEUTROPHILS # BLD AUTO: 2.51 THOUSANDS/ΜL (ref 1.85–7.62)
NEUTS SEG NFR BLD AUTO: 61 % (ref 43–75)
NONHDLC SERPL-MCNC: 132 MG/DL
NRBC BLD AUTO-RTO: 0 /100 WBCS
PLATELET # BLD AUTO: 177 THOUSANDS/UL (ref 149–390)
PMV BLD AUTO: 9.7 FL (ref 8.9–12.7)
POTASSIUM SERPL-SCNC: 4 MMOL/L (ref 3.5–5.3)
PROT SERPL-MCNC: 8.3 G/DL (ref 6.4–8.2)
RBC # BLD AUTO: 4.43 MILLION/UL (ref 3.88–5.62)
SODIUM SERPL-SCNC: 138 MMOL/L (ref 136–145)
TRIGL SERPL-MCNC: 150 MG/DL
TSH SERPL DL<=0.05 MIU/L-ACNC: 5.45 UIU/ML (ref 0.36–3.74)
WBC # BLD AUTO: 4.12 THOUSAND/UL (ref 4.31–10.16)

## 2021-11-03 PROCEDURE — 76705 ECHO EXAM OF ABDOMEN: CPT

## 2021-11-03 PROCEDURE — 84443 ASSAY THYROID STIM HORMONE: CPT

## 2021-11-03 PROCEDURE — 36415 COLL VENOUS BLD VENIPUNCTURE: CPT

## 2021-11-03 PROCEDURE — 83036 HEMOGLOBIN GLYCOSYLATED A1C: CPT

## 2021-11-03 PROCEDURE — 85025 COMPLETE CBC W/AUTO DIFF WBC: CPT

## 2021-11-03 PROCEDURE — 80061 LIPID PANEL: CPT

## 2021-11-03 PROCEDURE — 80053 COMPREHEN METABOLIC PANEL: CPT

## 2021-11-18 ENCOUNTER — HOSPITAL ENCOUNTER (OUTPATIENT)
Dept: MRI IMAGING | Facility: CLINIC | Age: 56
Discharge: HOME | End: 2021-11-18
Payer: COMMERCIAL

## 2021-11-18 DIAGNOSIS — M54.12 CERVICAL RADICULOPATHY: ICD-10-CM

## 2021-11-18 DIAGNOSIS — M54.14 THORACIC RADICULOPATHY: ICD-10-CM

## 2021-11-18 PROCEDURE — 72141 MRI NECK SPINE W/O DYE: CPT

## 2021-11-18 PROCEDURE — G1004 CDSM NDSC: HCPCS

## 2021-11-22 ENCOUNTER — TELEPHONE (OUTPATIENT)
Dept: RADIOLOGY | Facility: CLINIC | Age: 56
End: 2021-11-22

## 2021-11-24 ENCOUNTER — TELEPHONE (OUTPATIENT)
Dept: INTERNAL MEDICINE CLINIC | Facility: CLINIC | Age: 56
End: 2021-11-24

## 2021-11-24 ENCOUNTER — OFFICE VISIT (OUTPATIENT)
Dept: INTERNAL MEDICINE CLINIC | Facility: CLINIC | Age: 56
End: 2021-11-24
Payer: COMMERCIAL

## 2021-11-24 VITALS
DIASTOLIC BLOOD PRESSURE: 82 MMHG | SYSTOLIC BLOOD PRESSURE: 120 MMHG | RESPIRATION RATE: 16 BRPM | HEIGHT: 67 IN | HEART RATE: 98 BPM | TEMPERATURE: 97.2 F | OXYGEN SATURATION: 97 % | BODY MASS INDEX: 25.96 KG/M2 | WEIGHT: 165.4 LBS

## 2021-11-24 DIAGNOSIS — I10 BENIGN ESSENTIAL HYPERTENSION: Primary | ICD-10-CM

## 2021-11-24 DIAGNOSIS — E03.9 ACQUIRED HYPOTHYROIDISM: ICD-10-CM

## 2021-11-24 DIAGNOSIS — E78.2 MIXED HYPERLIPIDEMIA: ICD-10-CM

## 2021-11-24 DIAGNOSIS — Z11.4 SCREENING FOR HIV (HUMAN IMMUNODEFICIENCY VIRUS): ICD-10-CM

## 2021-11-24 DIAGNOSIS — R10.13 EPIGASTRIC PAIN: ICD-10-CM

## 2021-11-24 DIAGNOSIS — M54.12 CERVICAL RADICULOPATHY: ICD-10-CM

## 2021-11-24 PROBLEM — M79.641 PAIN OF RIGHT HAND: Status: RESOLVED | Noted: 2021-07-21 | Resolved: 2021-11-24

## 2021-11-24 PROBLEM — R10.11 RIGHT UPPER QUADRANT ABDOMINAL PAIN: Status: RESOLVED | Noted: 2021-07-21 | Resolved: 2021-11-24

## 2021-11-24 PROCEDURE — 1036F TOBACCO NON-USER: CPT | Performed by: INTERNAL MEDICINE

## 2021-11-24 PROCEDURE — 99214 OFFICE O/P EST MOD 30 MIN: CPT | Performed by: INTERNAL MEDICINE

## 2021-11-24 PROCEDURE — 3008F BODY MASS INDEX DOCD: CPT | Performed by: INTERNAL MEDICINE

## 2021-11-26 DIAGNOSIS — I10 HYPERTENSION, UNSPECIFIED TYPE: ICD-10-CM

## 2021-11-26 DIAGNOSIS — E78.2 MIXED HYPERLIPIDEMIA: ICD-10-CM

## 2021-11-26 RX ORDER — AMLODIPINE BESYLATE 10 MG/1
10 TABLET ORAL DAILY
Qty: 90 TABLET | Refills: 1 | Status: SHIPPED | OUTPATIENT
Start: 2021-11-26 | End: 2022-06-01 | Stop reason: SDUPTHER

## 2021-11-26 RX ORDER — ATORVASTATIN CALCIUM 20 MG/1
20 TABLET, FILM COATED ORAL DAILY
Qty: 90 TABLET | Refills: 3 | Status: SHIPPED | OUTPATIENT
Start: 2021-11-26

## 2021-12-13 ENCOUNTER — OFFICE VISIT (OUTPATIENT)
Dept: PAIN MEDICINE | Facility: CLINIC | Age: 56
End: 2021-12-13
Payer: COMMERCIAL

## 2021-12-13 VITALS
WEIGHT: 160 LBS | DIASTOLIC BLOOD PRESSURE: 77 MMHG | BODY MASS INDEX: 25.11 KG/M2 | HEART RATE: 93 BPM | HEIGHT: 67 IN | SYSTOLIC BLOOD PRESSURE: 138 MMHG

## 2021-12-13 DIAGNOSIS — M54.12 CERVICAL RADICULOPATHY: Primary | ICD-10-CM

## 2021-12-13 DIAGNOSIS — M79.18 MYOFASCIAL PAIN SYNDROME: ICD-10-CM

## 2021-12-13 PROCEDURE — 3008F BODY MASS INDEX DOCD: CPT | Performed by: STUDENT IN AN ORGANIZED HEALTH CARE EDUCATION/TRAINING PROGRAM

## 2021-12-13 PROCEDURE — 1036F TOBACCO NON-USER: CPT | Performed by: STUDENT IN AN ORGANIZED HEALTH CARE EDUCATION/TRAINING PROGRAM

## 2021-12-13 PROCEDURE — 99214 OFFICE O/P EST MOD 30 MIN: CPT | Performed by: STUDENT IN AN ORGANIZED HEALTH CARE EDUCATION/TRAINING PROGRAM

## 2022-03-01 ENCOUNTER — OFFICE VISIT (OUTPATIENT)
Dept: GASTROENTEROLOGY | Facility: CLINIC | Age: 57
End: 2022-03-01
Payer: COMMERCIAL

## 2022-03-01 VITALS
HEIGHT: 67 IN | DIASTOLIC BLOOD PRESSURE: 82 MMHG | HEART RATE: 108 BPM | SYSTOLIC BLOOD PRESSURE: 122 MMHG | BODY MASS INDEX: 26.21 KG/M2 | WEIGHT: 167 LBS | OXYGEN SATURATION: 97 %

## 2022-03-01 DIAGNOSIS — R14.0 BLOATING: ICD-10-CM

## 2022-03-01 DIAGNOSIS — K76.0 FATTY LIVER: ICD-10-CM

## 2022-03-01 DIAGNOSIS — R17 ELEVATED BILIRUBIN: ICD-10-CM

## 2022-03-01 DIAGNOSIS — R10.11 RIGHT UPPER QUADRANT ABDOMINAL PAIN: ICD-10-CM

## 2022-03-01 DIAGNOSIS — R10.13 EPIGASTRIC PAIN: Primary | ICD-10-CM

## 2022-03-01 PROCEDURE — 99214 OFFICE O/P EST MOD 30 MIN: CPT | Performed by: INTERNAL MEDICINE

## 2022-03-01 NOTE — PROGRESS NOTES
[facility]      Chief Complaint:  Abdominal pain    HPI:  Ananth Berkowitz is a 61 y o   male who presents with a history of abdominal pain which apparently goes back about fiber 6 years  According to the patient ever since he turned 47 he has been having some digestive issues  This revolves around abdominal pain both epigastric and right upper quadrant which is usually postprandial   At 1 point it was so bad he went for an ultrasound which just showed a fatty liver  This was on November 3, 2021  Patient had some nausea following infection with COVID but none since then  No vomiting  He has no dysphagia or odynophagia  No GERD or heartburn  No nocturnal symptomatology  No exertional symptomatology  The pain does not radiate  Usually follows most types of meals  Occasionally the bloating is worse than at other times  He has no alarm symptomatology  He has not tried any medications for this  Patient has blood work from November 3, 2021 with a complete metabolic profile showing a protein of 8 3 and total bilirubin of 1 01   CBC showed a white count of 4 12 with lymphocytes of 0 56  Ultrasound reviewed above  He did not go for the repeat labs that were ordered  He has no prior history of known liver disease that he knows of  He has not had any recent weight gain or weight loss  No recent fever or chills  No other associated symptomatology         Review of Systems:   Constitutional: No fever or chills, feels well, no tiredness, no recent weight gain or weight loss  HENT: No complaints of earache, no hearing loss, no nosebleeds, no nasal discharge, no sore throat, no hoarseness  Eyes: No complaints of eye pain, no red eyes, no discharge from eyes, no itchy eyes  Cardiovascular: No complaints of slow heart rate, no fast heart rate, no chest pain, no palpitations, no leg claudication, no lower extremity edema     Respiratory: No complaints of shortness of breath, no wheezing, no cough, no SOB on exertion, no orthopnea  Gastrointestinal: As noted in HPI  Genitourinary: No complaints of dysuria, no incontinence, no hesitancy, no nocturia  Musculoskeletal: No complaints of arthralgia, no myalgias, no joint swelling or stiffness, no limb pain or swelling  Neurological: No complaints of headache, no confusion, no convulsions, no numbness or tingling, no dizziness or fainting, no limb weakness, no difficulty walking  Skin: No complaints of skin rash or skin lesions, no itching, no skin wound, no dry skin  Hematological/Lymphatic: No complaints of swollen glands, does not bleed easy  Allergic/Immunologic: No immunocompromised state  Endocrine:  No complaints of polyuria, no polydipsia  Psychiatric/Behavioral: is not suicidal, no sleep disturbances, no anxiety or depression, no change in personality, no emotional problems  Historical Information   Past Medical History:   Diagnosis Date    Allergic conjunctivitis     Resolved 12/18/2017     Asthma     Disease of thyroid gland     Hypertension      Past Surgical History:   Procedure Laterality Date    COLONOSCOPY  2021    FOOT ARTHRODESIS       Social History   Social History     Substance and Sexual Activity   Alcohol Use Yes    Comment: Social      Social History     Substance and Sexual Activity   Drug Use Yes    Types: Marijuana    Comment: socially     Social History     Tobacco Use   Smoking Status Never Smoker   Smokeless Tobacco Never Used     Family History   Problem Relation Age of Onset    Hyperlipidemia Mother     Hypertension Mother          Current Medications: has a current medication list which includes the following prescription(s): albuterol, atorvastatin, famotidine, and amlodipine          Vital Signs: /82   Pulse (!) 108   Ht 5' 7" (1 702 m)   Wt 75 8 kg (167 lb)   SpO2 97%   BMI 26 16 kg/m²       Physical Exam:   Constitutional  General Appearance: No acute distress, well appearing and well nourished  Head  Normocephalic  Eyes  Conjunctivae and lids: No swelling, erythema, or discharge  Pupils and irises: Equal, round and reactive to light  Ears, Nose, Mouth, and Throat  External inspection of ears and nose: Normal  Nasal mucosa, septum and turbinates: Normal without edema or erythema/   Oropharynx: Normal with no erythema, edema, exudate or lesions  Neck  Normal range of motion  Neck supple  Cardiovascular  Auscultation of the heart: Normal rate and rhythm, normal S1 and S2 without murmurs  Examination of the extremities for edema and/or varicosities: Normal  Pulmonary/Chest  Respiratory effort: No increased work of breathing or signs of respiratory distress  Auscultation of lungs: Clear to auscultation, equal breath sounds bilaterally, no wheezes, rales, no rhonchi  Abdomen  Abdomen: Non-tender, no masses  Liver and spleen: No hepatomegaly or splenomegaly  Musculoskeletal  Gait and station: normal   Digits and Nails: normal without clubbing or cyanosis  Inspection/palpation of joints, bones, and muscles: Normal  Neurological  No nystagmus or asterixis  Skin  Skin and subcutaneous tissue: Normal without rashes or lesions  Lymphatic  Palpation of the lymph nodes in neck: No lymphadenopathy     Psychiatric  Orientation to person, place and time: Normal   Mood and affect: Normal          Labs:  Lab Results   Component Value Date    ALT 44 11/03/2021    AST 32 11/03/2021    BUN 10 11/03/2021    CALCIUM 9 2 11/03/2021     11/03/2021    CO2 30 11/03/2021    CREATININE 1 16 11/03/2021    HDL 94 11/03/2021    HCT 42 3 11/03/2021    HGB 15 3 11/03/2021    HGBA1C 5 0 11/03/2021     11/03/2021    K 4 0 11/03/2021    TRIG 150 11/03/2021    WBC 4 12 (L) 11/03/2021         X-Rays & Procedures:   NM hepatobiliary w rx    (Results Pending)           ______________________________________________________________________      Assessment & Plan:     Diagnoses and all orders for this visit:    Epigastric pain  -     Ambulatory referral to Gastroenterology  -     EGD; Future  -     Hepatic function panel; Future  -     NM hepatobiliary w rx; Future    Right upper quadrant abdominal pain  -     EGD; Future  -     Hepatic function panel; Future  -     NM hepatobiliary w rx; Future    Bloating  -     EGD; Future  -     Hepatic function panel; Future  -     NM hepatobiliary w rx; Future    Fatty liver    Elevated bilirubin      Patient will undergo EGD and HIDA scan  He is given a copy of a low-fat carbohydrate diet  Repeat LFTs will be done in 3 months time  Further recommendations will depend on study results and progress

## 2022-03-01 NOTE — H&P (VIEW-ONLY)
[facility]      Chief Complaint:  Abdominal pain    HPI:  Anthony Perez is a 61 y o   male who presents with a history of abdominal pain which apparently goes back about fiber 6 years  According to the patient ever since he turned 47 he has been having some digestive issues  This revolves around abdominal pain both epigastric and right upper quadrant which is usually postprandial   At 1 point it was so bad he went for an ultrasound which just showed a fatty liver  This was on November 3, 2021  Patient had some nausea following infection with COVID but none since then  No vomiting  He has no dysphagia or odynophagia  No GERD or heartburn  No nocturnal symptomatology  No exertional symptomatology  The pain does not radiate  Usually follows most types of meals  Occasionally the bloating is worse than at other times  He has no alarm symptomatology  He has not tried any medications for this  Patient has blood work from November 3, 2021 with a complete metabolic profile showing a protein of 8 3 and total bilirubin of 1 01   CBC showed a white count of 4 12 with lymphocytes of 0 56  Ultrasound reviewed above  He did not go for the repeat labs that were ordered  He has no prior history of known liver disease that he knows of  He has not had any recent weight gain or weight loss  No recent fever or chills  No other associated symptomatology         Review of Systems:   Constitutional: No fever or chills, feels well, no tiredness, no recent weight gain or weight loss  HENT: No complaints of earache, no hearing loss, no nosebleeds, no nasal discharge, no sore throat, no hoarseness  Eyes: No complaints of eye pain, no red eyes, no discharge from eyes, no itchy eyes  Cardiovascular: No complaints of slow heart rate, no fast heart rate, no chest pain, no palpitations, no leg claudication, no lower extremity edema     Respiratory: No complaints of shortness of breath, no wheezing, no cough, no SOB on exertion, no orthopnea  Gastrointestinal: As noted in HPI  Genitourinary: No complaints of dysuria, no incontinence, no hesitancy, no nocturia  Musculoskeletal: No complaints of arthralgia, no myalgias, no joint swelling or stiffness, no limb pain or swelling  Neurological: No complaints of headache, no confusion, no convulsions, no numbness or tingling, no dizziness or fainting, no limb weakness, no difficulty walking  Skin: No complaints of skin rash or skin lesions, no itching, no skin wound, no dry skin  Hematological/Lymphatic: No complaints of swollen glands, does not bleed easy  Allergic/Immunologic: No immunocompromised state  Endocrine:  No complaints of polyuria, no polydipsia  Psychiatric/Behavioral: is not suicidal, no sleep disturbances, no anxiety or depression, no change in personality, no emotional problems  Historical Information   Past Medical History:   Diagnosis Date    Allergic conjunctivitis     Resolved 12/18/2017     Asthma     Disease of thyroid gland     Hypertension      Past Surgical History:   Procedure Laterality Date    COLONOSCOPY  2021    FOOT ARTHRODESIS       Social History   Social History     Substance and Sexual Activity   Alcohol Use Yes    Comment: Social      Social History     Substance and Sexual Activity   Drug Use Yes    Types: Marijuana    Comment: socially     Social History     Tobacco Use   Smoking Status Never Smoker   Smokeless Tobacco Never Used     Family History   Problem Relation Age of Onset    Hyperlipidemia Mother     Hypertension Mother          Current Medications: has a current medication list which includes the following prescription(s): albuterol, atorvastatin, famotidine, and amlodipine          Vital Signs: /82   Pulse (!) 108   Ht 5' 7" (1 702 m)   Wt 75 8 kg (167 lb)   SpO2 97%   BMI 26 16 kg/m²       Physical Exam:   Constitutional  General Appearance: No acute distress, well appearing and well nourished  Head  Normocephalic  Eyes  Conjunctivae and lids: No swelling, erythema, or discharge  Pupils and irises: Equal, round and reactive to light  Ears, Nose, Mouth, and Throat  External inspection of ears and nose: Normal  Nasal mucosa, septum and turbinates: Normal without edema or erythema/   Oropharynx: Normal with no erythema, edema, exudate or lesions  Neck  Normal range of motion  Neck supple  Cardiovascular  Auscultation of the heart: Normal rate and rhythm, normal S1 and S2 without murmurs  Examination of the extremities for edema and/or varicosities: Normal  Pulmonary/Chest  Respiratory effort: No increased work of breathing or signs of respiratory distress  Auscultation of lungs: Clear to auscultation, equal breath sounds bilaterally, no wheezes, rales, no rhonchi  Abdomen  Abdomen: Non-tender, no masses  Liver and spleen: No hepatomegaly or splenomegaly  Musculoskeletal  Gait and station: normal   Digits and Nails: normal without clubbing or cyanosis  Inspection/palpation of joints, bones, and muscles: Normal  Neurological  No nystagmus or asterixis  Skin  Skin and subcutaneous tissue: Normal without rashes or lesions  Lymphatic  Palpation of the lymph nodes in neck: No lymphadenopathy     Psychiatric  Orientation to person, place and time: Normal   Mood and affect: Normal          Labs:  Lab Results   Component Value Date    ALT 44 11/03/2021    AST 32 11/03/2021    BUN 10 11/03/2021    CALCIUM 9 2 11/03/2021     11/03/2021    CO2 30 11/03/2021    CREATININE 1 16 11/03/2021    HDL 94 11/03/2021    HCT 42 3 11/03/2021    HGB 15 3 11/03/2021    HGBA1C 5 0 11/03/2021     11/03/2021    K 4 0 11/03/2021    TRIG 150 11/03/2021    WBC 4 12 (L) 11/03/2021         X-Rays & Procedures:   NM hepatobiliary w rx    (Results Pending)           ______________________________________________________________________      Assessment & Plan:     Diagnoses and all orders for this visit:    Epigastric pain  -     Ambulatory referral to Gastroenterology  -     EGD; Future  -     Hepatic function panel; Future  -     NM hepatobiliary w rx; Future    Right upper quadrant abdominal pain  -     EGD; Future  -     Hepatic function panel; Future  -     NM hepatobiliary w rx; Future    Bloating  -     EGD; Future  -     Hepatic function panel; Future  -     NM hepatobiliary w rx; Future    Fatty liver    Elevated bilirubin      Patient will undergo EGD and HIDA scan  He is given a copy of a low-fat carbohydrate diet  Repeat LFTs will be done in 3 months time  Further recommendations will depend on study results and progress

## 2022-03-09 ENCOUNTER — ANESTHESIA EVENT (OUTPATIENT)
Dept: GASTROENTEROLOGY | Facility: HOSPITAL | Age: 57
End: 2022-03-09

## 2022-03-09 ENCOUNTER — ANESTHESIA (OUTPATIENT)
Dept: GASTROENTEROLOGY | Facility: HOSPITAL | Age: 57
End: 2022-03-09

## 2022-03-09 ENCOUNTER — TELEPHONE (OUTPATIENT)
Dept: GASTROENTEROLOGY | Facility: CLINIC | Age: 57
End: 2022-03-09

## 2022-03-09 ENCOUNTER — HOSPITAL ENCOUNTER (OUTPATIENT)
Dept: GASTROENTEROLOGY | Facility: HOSPITAL | Age: 57
Setting detail: OUTPATIENT SURGERY
Discharge: HOME | End: 2022-03-09
Attending: INTERNAL MEDICINE
Payer: COMMERCIAL

## 2022-03-09 VITALS
WEIGHT: 169.31 LBS | BODY MASS INDEX: 26.57 KG/M2 | TEMPERATURE: 98 F | HEART RATE: 88 BPM | RESPIRATION RATE: 20 BRPM | DIASTOLIC BLOOD PRESSURE: 88 MMHG | HEIGHT: 67 IN | OXYGEN SATURATION: 94 % | SYSTOLIC BLOOD PRESSURE: 129 MMHG

## 2022-03-09 DIAGNOSIS — K22.10 EROSIVE ESOPHAGITIS: Primary | ICD-10-CM

## 2022-03-09 DIAGNOSIS — R14.0 BLOATING: ICD-10-CM

## 2022-03-09 DIAGNOSIS — R10.11 RIGHT UPPER QUADRANT ABDOMINAL PAIN: ICD-10-CM

## 2022-03-09 DIAGNOSIS — R10.13 EPIGASTRIC PAIN: ICD-10-CM

## 2022-03-09 PROCEDURE — 43239 EGD BIOPSY SINGLE/MULTIPLE: CPT | Performed by: INTERNAL MEDICINE

## 2022-03-09 PROCEDURE — 88305 TISSUE EXAM BY PATHOLOGIST: CPT | Performed by: PATHOLOGY

## 2022-03-09 PROCEDURE — 88342 IMHCHEM/IMCYTCHM 1ST ANTB: CPT | Performed by: PATHOLOGY

## 2022-03-09 RX ORDER — PANTOPRAZOLE SODIUM 40 MG/1
40 TABLET, DELAYED RELEASE ORAL EVERY MORNING
Qty: 30 TABLET | Refills: 2 | Status: SHIPPED | OUTPATIENT
Start: 2022-03-09 | End: 2022-08-03

## 2022-03-09 RX ORDER — LIDOCAINE HYDROCHLORIDE 10 MG/ML
INJECTION, SOLUTION EPIDURAL; INFILTRATION; INTRACAUDAL; PERINEURAL AS NEEDED
Status: DISCONTINUED | OUTPATIENT
Start: 2022-03-09 | End: 2022-03-09

## 2022-03-09 RX ORDER — SODIUM CHLORIDE, SODIUM LACTATE, POTASSIUM CHLORIDE, CALCIUM CHLORIDE 600; 310; 30; 20 MG/100ML; MG/100ML; MG/100ML; MG/100ML
125 INJECTION, SOLUTION INTRAVENOUS CONTINUOUS
Status: DISCONTINUED | OUTPATIENT
Start: 2022-03-09 | End: 2022-03-13 | Stop reason: HOSPADM

## 2022-03-09 RX ORDER — SODIUM CHLORIDE, SODIUM LACTATE, POTASSIUM CHLORIDE, CALCIUM CHLORIDE 600; 310; 30; 20 MG/100ML; MG/100ML; MG/100ML; MG/100ML
INJECTION, SOLUTION INTRAVENOUS CONTINUOUS PRN
Status: DISCONTINUED | OUTPATIENT
Start: 2022-03-09 | End: 2022-03-09

## 2022-03-09 RX ORDER — PROPOFOL 10 MG/ML
INJECTION, EMULSION INTRAVENOUS AS NEEDED
Status: DISCONTINUED | OUTPATIENT
Start: 2022-03-09 | End: 2022-03-09

## 2022-03-09 RX ORDER — CETIRIZINE HYDROCHLORIDE 10 MG/1
CAPSULE, LIQUID FILLED ORAL
COMMUNITY

## 2022-03-09 RX ADMIN — SODIUM CHLORIDE, SODIUM LACTATE, POTASSIUM CHLORIDE, AND CALCIUM CHLORIDE: .6; .31; .03; .02 INJECTION, SOLUTION INTRAVENOUS at 08:49

## 2022-03-09 RX ADMIN — PROPOFOL 150 MG: 10 INJECTION, EMULSION INTRAVENOUS at 08:51

## 2022-03-09 RX ADMIN — PROPOFOL 50 MG: 10 INJECTION, EMULSION INTRAVENOUS at 08:54

## 2022-03-09 RX ADMIN — SODIUM CHLORIDE, POTASSIUM CHLORIDE, SODIUM LACTATE AND CALCIUM CHLORIDE 125 ML/HR: 600; 310; 30; 20 INJECTION, SOLUTION INTRAVENOUS at 07:57

## 2022-03-09 RX ADMIN — LIDOCAINE HYDROCHLORIDE 100 MG: 10 INJECTION, SOLUTION EPIDURAL; INFILTRATION; INTRACAUDAL; PERINEURAL at 08:51

## 2022-03-09 NOTE — ANESTHESIA POSTPROCEDURE EVALUATION
Post-Op Assessment Note    CV Status:  Stable    Pain management: adequate     Mental Status:  Alert and awake   Hydration Status:  Euvolemic   PONV Controlled:  Controlled   Airway Patency:  Patent      Post Op Vitals Reviewed: Yes      Staff: CRNA         No complications documented      BP   118/59   Temp     Pulse     Resp      SpO2 99%

## 2022-03-09 NOTE — ANESTHESIA PREPROCEDURE EVALUATION
Procedure:  EGD    Relevant Problems   CARDIO   (+) Benign essential hypertension   (+) Mixed hyperlipidemia      ENDO   (+) Acquired hypothyroidism      MUSCULOSKELETAL   (+) Lateral epicondylitis of right elbow      PULMONARY   (+) Asthma   (+) Mild persistent asthma without complication       Jose Maria Fallon is a 61 y o   male who presents with a history of abdominal pain which apparently goes back about fiber 6 years  According to the patient ever since he turned 47 he has been having some digestive issues  This revolves around abdominal pain both epigastric and right upper quadrant which is usually postprandial   At 1 point it was so bad he went for an ultrasound which just showed a fatty liver  This was on November 3, 2021  Patient had some nausea following infection with COVID but none since then  No vomiting  He has no dysphagia or odynophagia  No GERD or heartburn  No nocturnal symptomatology  No exertional symptomatology  The pain does not radiate  Usually follows most types of meals  Occasionally the bloating is worse than at other times  He has no alarm symptomatology  He has not tried any medications for this  Patient has blood work from November 3, 2021 with a complete metabolic profile showing a protein of 8 3 and total bilirubin of 1 01   CBC showed a white count of 4 12 with lymphocytes of 0 56  Ultrasound reviewed above  He did not go for the repeat labs that were ordered  He has no prior history of known liver disease that he knows of  He has not had any recent weight gain or weight loss  No recent fever or chills  No other associated symptomatology        Physical Exam    Airway    Mallampati score: III  TM Distance: >3 FB  Neck ROM: full     Dental       Cardiovascular  Cardiovascular exam normal    Pulmonary  Pulmonary exam normal     Other Findings      Allergic rhinitis   Benign essential hypertension   Dermatitis   Cervical radiculopathy   Lumbar radiculopathy   WBC disease   Mild persistent asthma without complication   Overweight   Lateral epicondylitis of right elbow   Asthma   Hyperglycemia   Mixed hyperlipidemia   Acquired hypothyroidism       Anesthesia Plan  ASA Score- 2     Anesthesia Type- IV sedation with anesthesia with ASA Monitors  Additional Monitors:   Airway Plan:           Plan Factors-Exercise tolerance (METS): >4 METS  Chart reviewed  EKG reviewed  Existing labs reviewed  Patient summary reviewed  Patient is not a current smoker  Induction- intravenous  Postoperative Plan-     Informed Consent- Anesthetic plan and risks discussed with patient  I personally reviewed this patient with the CRNA  Discussed and agreed on the Anesthesia Plan with the CRNA  Desi Patterson

## 2022-03-09 NOTE — INTERVAL H&P NOTE
H&P reviewed  After examining the patient I find no changes in the patients condition since the H&P had been written      Vitals:    03/09/22 0746   BP: 137/86   Pulse: 101   Resp: 16   Temp: (!) 97 4 °F (36 3 °C)   SpO2: 98%

## 2022-03-17 ENCOUNTER — TELEPHONE (OUTPATIENT)
Dept: GASTROENTEROLOGY | Facility: CLINIC | Age: 57
End: 2022-03-17

## 2022-03-17 NOTE — TELEPHONE ENCOUNTER
Please tell him the biopsies were completely benign  There was no infection  He is to continue the Protonix and call me in 6 weeks with his progress

## 2022-03-25 ENCOUNTER — APPOINTMENT (OUTPATIENT)
Dept: LAB | Facility: CLINIC | Age: 57
End: 2022-03-25
Payer: COMMERCIAL

## 2022-03-25 DIAGNOSIS — Z11.4 SCREENING FOR HIV (HUMAN IMMUNODEFICIENCY VIRUS): ICD-10-CM

## 2022-03-25 DIAGNOSIS — E78.2 MIXED HYPERLIPIDEMIA: ICD-10-CM

## 2022-03-25 PROBLEM — E55.9 VITAMIN D DEFICIENCY: Status: ACTIVE | Noted: 2022-03-25

## 2022-03-25 LAB
25(OH)D3 SERPL-MCNC: 9.7 NG/ML (ref 30–100)
ALBUMIN SERPL BCP-MCNC: 4 G/DL (ref 3.5–5)
ALP SERPL-CCNC: 116 U/L (ref 46–116)
ALT SERPL W P-5'-P-CCNC: 36 U/L (ref 12–78)
ANION GAP SERPL CALCULATED.3IONS-SCNC: 6 MMOL/L (ref 4–13)
AST SERPL W P-5'-P-CCNC: 30 U/L (ref 5–45)
BASOPHILS # BLD AUTO: 0.06 THOUSANDS/ΜL (ref 0–0.1)
BASOPHILS NFR BLD AUTO: 1 % (ref 0–1)
BILIRUB SERPL-MCNC: 0.94 MG/DL (ref 0.2–1)
BUN SERPL-MCNC: 10 MG/DL (ref 5–25)
CALCIUM SERPL-MCNC: 9.5 MG/DL (ref 8.3–10.1)
CHLORIDE SERPL-SCNC: 103 MMOL/L (ref 100–108)
CHOLEST SERPL-MCNC: 194 MG/DL
CO2 SERPL-SCNC: 27 MMOL/L (ref 21–32)
CREAT SERPL-MCNC: 1.24 MG/DL (ref 0.6–1.3)
EOSINOPHIL # BLD AUTO: 0.23 THOUSAND/ΜL (ref 0–0.61)
EOSINOPHIL NFR BLD AUTO: 3 % (ref 0–6)
ERYTHROCYTE [DISTWIDTH] IN BLOOD BY AUTOMATED COUNT: 12.4 % (ref 11.6–15.1)
GFR SERPL CREATININE-BSD FRML MDRD: 64 ML/MIN/1.73SQ M
GLUCOSE P FAST SERPL-MCNC: 115 MG/DL (ref 65–99)
HCT VFR BLD AUTO: 42.1 % (ref 36.5–49.3)
HDLC SERPL-MCNC: 62 MG/DL
HGB BLD-MCNC: 15.4 G/DL (ref 12–17)
IMM GRANULOCYTES # BLD AUTO: 0.03 THOUSAND/UL (ref 0–0.2)
IMM GRANULOCYTES NFR BLD AUTO: 0 % (ref 0–2)
LDLC SERPL CALC-MCNC: 96 MG/DL (ref 0–100)
LYMPHOCYTES # BLD AUTO: 0.56 THOUSANDS/ΜL (ref 0.6–4.47)
LYMPHOCYTES NFR BLD AUTO: 8 % (ref 14–44)
MCH RBC QN AUTO: 33.3 PG (ref 26.8–34.3)
MCHC RBC AUTO-ENTMCNC: 36.6 G/DL (ref 31.4–37.4)
MCV RBC AUTO: 91 FL (ref 82–98)
MONOCYTES # BLD AUTO: 0.64 THOUSAND/ΜL (ref 0.17–1.22)
MONOCYTES NFR BLD AUTO: 10 % (ref 4–12)
NEUTROPHILS # BLD AUTO: 5.15 THOUSANDS/ΜL (ref 1.85–7.62)
NEUTS SEG NFR BLD AUTO: 78 % (ref 43–75)
NONHDLC SERPL-MCNC: 132 MG/DL
NRBC BLD AUTO-RTO: 0 /100 WBCS
PLATELET # BLD AUTO: 228 THOUSANDS/UL (ref 149–390)
PMV BLD AUTO: 9.7 FL (ref 8.9–12.7)
POTASSIUM SERPL-SCNC: 3.7 MMOL/L (ref 3.5–5.3)
PROT SERPL-MCNC: 8.3 G/DL (ref 6.4–8.2)
RBC # BLD AUTO: 4.62 MILLION/UL (ref 3.88–5.62)
SODIUM SERPL-SCNC: 136 MMOL/L (ref 136–145)
TRIGL SERPL-MCNC: 182 MG/DL
TSH SERPL DL<=0.05 MIU/L-ACNC: 3.99 UIU/ML (ref 0.36–3.74)
WBC # BLD AUTO: 6.67 THOUSAND/UL (ref 4.31–10.16)

## 2022-03-25 PROCEDURE — 80061 LIPID PANEL: CPT

## 2022-03-25 PROCEDURE — 80053 COMPREHEN METABOLIC PANEL: CPT

## 2022-03-25 PROCEDURE — 85025 COMPLETE CBC W/AUTO DIFF WBC: CPT

## 2022-03-25 PROCEDURE — 84443 ASSAY THYROID STIM HORMONE: CPT

## 2022-03-25 PROCEDURE — 36415 COLL VENOUS BLD VENIPUNCTURE: CPT

## 2022-03-25 PROCEDURE — 82306 VITAMIN D 25 HYDROXY: CPT

## 2022-03-26 ENCOUNTER — TELEPHONE (OUTPATIENT)
Dept: INTERNAL MEDICINE CLINIC | Facility: CLINIC | Age: 57
End: 2022-03-26

## 2022-03-26 NOTE — TELEPHONE ENCOUNTER
----- Message from Alaina Magdaleno MD sent at 3/25/2022  5:45 PM EDT -----   Vitamin-D is very low, please  the vitamin-D high dose from the pharmacy and take it weekly

## 2022-03-29 ENCOUNTER — OFFICE VISIT (OUTPATIENT)
Dept: INTERNAL MEDICINE CLINIC | Facility: CLINIC | Age: 57
End: 2022-03-29
Payer: COMMERCIAL

## 2022-03-29 VITALS
BODY MASS INDEX: 25.66 KG/M2 | HEART RATE: 92 BPM | OXYGEN SATURATION: 97 % | SYSTOLIC BLOOD PRESSURE: 122 MMHG | WEIGHT: 163.5 LBS | DIASTOLIC BLOOD PRESSURE: 84 MMHG | HEIGHT: 67 IN | RESPIRATION RATE: 18 BRPM | TEMPERATURE: 98.4 F

## 2022-03-29 DIAGNOSIS — E03.9 ACQUIRED HYPOTHYROIDISM: ICD-10-CM

## 2022-03-29 DIAGNOSIS — R73.9 HYPERGLYCEMIA: ICD-10-CM

## 2022-03-29 DIAGNOSIS — J45.30 MILD PERSISTENT ASTHMA WITHOUT COMPLICATION: ICD-10-CM

## 2022-03-29 DIAGNOSIS — E66.3 OVERWEIGHT: ICD-10-CM

## 2022-03-29 DIAGNOSIS — E55.9 VITAMIN D DEFICIENCY: ICD-10-CM

## 2022-03-29 DIAGNOSIS — I10 BENIGN ESSENTIAL HYPERTENSION: Primary | ICD-10-CM

## 2022-03-29 DIAGNOSIS — E78.2 MIXED HYPERLIPIDEMIA: ICD-10-CM

## 2022-03-29 PROCEDURE — 3725F SCREEN DEPRESSION PERFORMED: CPT | Performed by: INTERNAL MEDICINE

## 2022-03-29 PROCEDURE — 3008F BODY MASS INDEX DOCD: CPT | Performed by: INTERNAL MEDICINE

## 2022-03-29 PROCEDURE — 1036F TOBACCO NON-USER: CPT | Performed by: INTERNAL MEDICINE

## 2022-03-29 PROCEDURE — 99214 OFFICE O/P EST MOD 30 MIN: CPT | Performed by: INTERNAL MEDICINE

## 2022-03-29 NOTE — PROGRESS NOTES
INTERNAL MEDICINE OFFICE VISIT  [facility]  [address], [address] [address], [address] [facility]  Tel: [phone]      NAME: Azra Watson  AGE: 61 y o  SEX: male  : 1965   MRN: [id number]    DATE: 3/29/2022  TIME: 11:20 AM      Assessment and Plan:  1  Benign essential hypertension   continue present medication    2  Mixed hyperlipidemia   continue atorvastatin  - CBC and differential; Future  - Comprehensive metabolic panel; Future  - Lipid panel; Future  - TSH, 3rd generation; Future    3  Acquired hypothyroidism   follow-up TSH levels    4  Hyperglycemia   was advised to cut down on the carbohydrates in his diet  - Hemoglobin A1C; Future    5  Vitamin D deficiency    - Vitamin D 25 hydroxy; Future    6  Mild persistent asthma without complication   continue inhalers as needed    7  Overweight  BMI Counseling: Body mass index is 25 61 kg/m²  The BMI is above normal  Nutrition recommendations include decreasing portion sizes, encouraging healthy choices of fruits and vegetables and moderation in carbohydrate intake  Exercise recommendations include moderate physical activity 150 minutes/week  Rationale for BMI follow-up plan is due to patient being overweight or obese  Depression Screening and Follow-up Plan: Patient was screened for depression during today's encounter  They screened negative with a PHQ-2 score of 0           - Counseling Documentation: patient was counseled regarding: diagnostic results, instructions for management, risk factor reductions, prognosis, patient and family education, risks and benefits of treatment options and importance of compliance with treatment  - Medication Side Effects: Adverse side effects of medications were reviewed with the patient/guardian today  Return for follow up visit in  4 months or earlier, if needed        Chief Complaint:  Chief Complaint   Patient presents with    Follow-up     4 month w labs History of Present Illness:    blood pressure is stable on present medication  The cholesterol and TSH are stable  The fasting blood glucose level is 115   vitamin-D is very low and he was started on high-dose vitamin-D supplement   His GFR is lower than before this time and he was made aware of it    Active Problem List:  Patient Active Problem List   Diagnosis    Allergic rhinitis    Benign essential hypertension    Dermatitis    Cervical radiculopathy    Lumbar radiculopathy    WBC disease    Mild persistent asthma without complication    Overweight    Lateral epicondylitis of right elbow    Asthma    Hyperglycemia    Mixed hyperlipidemia    Acquired hypothyroidism    Vitamin D deficiency         Past Medical History:  Past Medical History:   Diagnosis Date    Allergic conjunctivitis     Resolved 12/18/2017     Asthma     Disease of thyroid gland     Hypertension          Past Surgical History:  Past Surgical History:   Procedure Laterality Date    COLONOSCOPY  2021    FOOT ARTHRODESIS           Family History:  Family History   Problem Relation Age of Onset    Hyperlipidemia Mother     Hypertension Mother          Social History:  Social History     Socioeconomic History    Marital status: Single     Spouse name: Not on file    Number of children: Not on file    Years of education: Not on file    Highest education level: Not on file   Occupational History    Not on file   Tobacco Use    Smoking status: Never Smoker    Smokeless tobacco: Never Used   Vaping Use    Vaping Use: Never used   Substance and Sexual Activity    Alcohol use: Yes     Comment: Social     Drug use: Yes     Types: Marijuana     Comment: socially    Sexual activity: Yes     Partners: Female   Other Topics Concern    Not on file   Social History Narrative    Not on file     Social Determinants of Health     Financial Resource Strain: Not on file   Food Insecurity: Not on file   Transportation Needs: Not on file   Physical Activity: Not on file   Stress: Not on file   Social Connections: Not on file   Intimate Partner Violence: Not on file   Housing Stability: Not on file         Allergies:   Allergies   Allergen Reactions    Cat Hair Extract     Dog Epithelium     Dust Mite Extract     Molds & Smuts     Other     Pollen Extract     Dario Grass Pollen Allergen          Medications:    Current Outpatient Medications:     albuterol (PROVENTIL HFA,VENTOLIN HFA) 90 mcg/act inhaler, Inhale 2 puffs every 6 (six) hours as needed for wheezing or shortness of breath, Disp: 1 Inhaler, Rfl: 5    atorvastatin (LIPITOR) 20 mg tablet, Take 1 tablet (20 mg total) by mouth daily, Disp: 90 tablet, Rfl: 3    Cetirizine HCl (ZyrTEC Allergy) 10 MG CAPS, Take by mouth, Disp: , Rfl:     ergocalciferol (VITAMIN D2) 50,000 units, Take 1 capsule (50,000 Units total) by mouth once a week, Disp: 12 capsule, Rfl: 3    Famotidine (PEPCID PO), Take by mouth, Disp: , Rfl:     pantoprazole (PROTONIX) 40 mg tablet, Take 1 tablet (40 mg total) by mouth every morning, Disp: 30 tablet, Rfl: 2    amLODIPine (NORVASC) 10 mg tablet, Take 1 tablet (10 mg total) by mouth daily, Disp: 90 tablet, Rfl: 1      The following portions of the patient's history were reviewed and updated as appropriate: past medical history, past surgical history, family history, social history, allergies, current medications and active problem list       Review of Systems:  Constitutional: Denies fever, chills, weight gain, weight loss, fatigue  Eyes: Denies eye redness, eye discharge, double vision, change in visual acuity  ENT: Denies hearing loss, tinnitus, sneezing, nasal congestion, nasal discharge, sore throat   Respiratory: Denies cough, expectoration, hemoptysis, shortness of breath, wheezing  Cardiovascular: Denies chest pain, palpitations, lower extremity swelling, orthopnea, PND  Gastrointestinal: Denies abdominal pain, heartburn, nausea, vomiting, hematemesis, diarrhea, bloody stools  Genito-Urinary: Denies dysuria, frequency, difficulty in micturition, nocturia, incontinence  Musculoskeletal: Denies back pain, joint pain, muscle pain  Neurologic: Denies confusion, lightheadedness, syncope, headache, focal weakness, sensory changes, seizures  Endocrine: Denies polyuria, polydipsia, temperature intolerance  Allergy and Immunology: Denies hives, insect bite sensitivity  Hematological and Lymphatic: Denies bleeding problems, swollen glands   Psychological: Denies depression, suicidal ideation, anxiety, panic, mood swings  Dermatological: Denies pruritus, rash, skin lesion changes      Vitals:  Vitals:    03/29/22 1104   BP: 122/84   Pulse: 92   Resp: 18   Temp: 98 4 °F (36 9 °C)   SpO2: 97%       Body mass index is 25 61 kg/m²  Weight (last 2 days)     Date/Time Weight    03/29/22 1104 74 2 (163 5)            Physical Examination:  General: Patient is not in acute distress  Awake, alert, responding to commands  No weight gain or loss  Head: Normocephalic  Atraumatic  Eyes: Conjunctiva and lids with no swelling, erythema or discharge  Both pupils normal sized, round and reactive to light  Sclera nonicteric  ENT: External examination of nose and ear normal  Otoscopic examination shows translucent tympanic membranes with patent canals without erythema  Oropharynx moist with no erythema, edema, exudate or lesions  Neck: Supple  JVP not raised  Trachea midline  No masses  No thyromegaly  Lungs: No signs of increased work of breathing or respiratory distress  Bilateral bronchovascular breath sounds with no crackles or rhonchi  Chest wall: No tenderness  Cardiovascular: Normal PMI  No thrills  Regular rate and rhythm  S1 and S2 normal  No murmur, rub or gallop  Gastrointestinal: Abdomen soft, nontender  No guarding or rigidity  Liver and spleen not palpable  Bowel sounds present  Neurologic: Cranial nerves II-XII intact   Cortical functions normal  Motor system - Reflexes 2+ and symmetrical  Sensations normal  Musculoskeletal: Gait normal  No joint tenderness  Integumentary: Skin normal with no rash or lesions  Lymphatic: No palpable lymph nodes in neck, axilla or groin  Extremities: No clubbing, cyanosis, edema or varicosities  Psychological: Judgement and insight normal  Mood and affect normal      Laboratory Results:  CBC with diff:   Lab Results   Component Value Date    WBC 6 67 03/25/2022    RBC 4 62 03/25/2022    HGB 15 4 03/25/2022    HCT 42 1 03/25/2022    MCV 91 03/25/2022    MCH 33 3 03/25/2022    RDW 12 4 03/25/2022     03/25/2022       CMP:  Lab Results   Component Value Date    CREATININE 1 24 03/25/2022    BUN 10 03/25/2022    K 3 7 03/25/2022     03/25/2022    CO2 27 03/25/2022    ALKPHOS 116 03/25/2022    ALT 36 03/25/2022    AST 30 03/25/2022       Lab Results   Component Value Date    HGBA1C 5 0 11/03/2021       No results found for: TROPONINI, CKMB, CKTOTAL    Lipid Profile:   No results found for: CHOL  Lab Results   Component Value Date    HDL 62 03/25/2022    HDL 94 11/03/2021     Lab Results   Component Value Date    LDLCALC 96 03/25/2022    LDLCALC 102 (H) 11/03/2021     Lab Results   Component Value Date    TRIG 182 (H) 03/25/2022    TRIG 150 11/03/2021       Imaging Results:  EGD  Narrative:  [facility]  [address] [place] [address]  [phone]    DATE OF SERVICE:  3/09/22    PHYSICIAN(S):  Emmy Benson MD Proceduralist     INDICATION:  Bloating, Epigastric pain, Right upper quadrant abdominal pain    POST-OP DIAGNOSIS:  See the impression below  PREPROCEDURE:  Informed consent was obtained for the procedure, including sedation  Risks of perforation, hemorrhage, adverse drug reaction and aspiration   were discussed  The patient was placed in the left lateral decubitus   position  Patient was explained about the risks and benefits of the procedure   Risks   including but not limited to bleeding, infection, and perforation were   explained in detail  Also explained about less than 100% sensitivity with   the exam and other alternatives  DETAILS OF PROCEDURE:  Patient was taken to the procedure room where a time out was performed to   confirm correct patient and correct procedure  The patient underwent   monitored anesthesia care, which was administered by an anesthesia   professional  The patient's blood pressure, heart rate, level of   consciousness, respirations and oxygen were monitored throughout the   procedure  The scope was advanced to the third part of the duodenum  Retroflexion was performed in the cardia, fundus and incisura  The   patient's estimated blood loss was minimal (<5 mL)  The procedure was not   difficult  The patient tolerated the procedure well  There were no   apparent complications  ANESTHESIA INFORMATION:  ASA: II  Anesthesia Type: IV Sedation with Anesthesia    MEDICATIONS:  No administrations occurring from 0848 to 0856 on 03/09/22     FINDINGS:  Mild, localized abnormal mucosa with erosion in the GE junction and   Z-line; performed cold forceps biopsy  Findings consistent with LA  class   A esophagitis  The upper third of the esophagus, middle third of the esophagus, lower   third of the esophagus, cardia, fundus of the stomach, body of the   stomach, incisura, antrum, prepyloric region, pylorus, duodenal bulb, 2nd   part of the duodenum and 3rd part of the duodenum appeared normal  Z-line   is 40 cm from the incisors  Performed biopsies in the fundus of the stomach, body of the stomach,   antrum and 3rd part of the duodenum    SPECIMENS:  ID Type Source Tests Collected by Time Destination   1 : 3rd Tissue Small Bowel, NOS TISSUE EXAM Sarita Gaspar MD 3/9/2022    8:55 AM    Impression: LA class A esophagitis status post biopsy    Otherwise normal EGD, status   post gastric and duodenal biopsies    RECOMMENDATION:   follow-up biopsy results in 2 weeks  Discontinue famotidine  Begin Protonix 40 mg p o  Q a m  1/2 hour a c  Breakfast times 12 weeks          Becky Overton MD        Health Maintenance:  Health Maintenance   Topic Date Due    Pneumococcal Vaccine: Pediatrics (0 to 5 Years) and At-Risk Patients (6 to 59 Years) (1 of 2 - PPSV23) Never done    HIV Screening  Never done    Annual Physical  Never done    DTaP,Tdap,and Td Vaccines (1 - Tdap) 07/18/2003    Influenza Vaccine (1) Never done    PT PLAN OF CARE  09/22/2021    COVID-19 Vaccine (3 - Booster for Pfizer series) 10/28/2021    BMI: Followup Plan  01/11/2022    Hepatitis C Screening  07/21/2022 (Originally 1965)    Depression Screening  03/29/2023    BMI: Adult  03/29/2023    Colorectal Cancer Screening  04/19/2026    HIB Vaccine  Aged Out    Hepatitis B Vaccine  Aged Out    IPV Vaccine  Aged Out    Hepatitis A Vaccine  Aged Out    Meningococcal ACWY Vaccine  Aged Out    HPV Vaccine  Aged Out     Immunization History   Administered Date(s) Administered    COVID-19 PFIZER VACCINE 0 3 ML IM 05/01/2021, 05/28/2021    Hep B, adult 11/08/2004    Td (adult), Unspecified 07/17/2003         Lawyer Gil MD  3/29/2022,11:20 AM

## 2022-06-01 DIAGNOSIS — I10 HYPERTENSION, UNSPECIFIED TYPE: ICD-10-CM

## 2022-06-01 RX ORDER — AMLODIPINE BESYLATE 10 MG/1
10 TABLET ORAL DAILY
Qty: 90 TABLET | Refills: 1 | Status: SHIPPED | OUTPATIENT
Start: 2022-06-01 | End: 2022-08-30

## 2022-06-13 DIAGNOSIS — R10.13 EPIGASTRIC PAIN: Primary | ICD-10-CM

## 2022-06-13 DIAGNOSIS — R14.0 BLOATING: ICD-10-CM

## 2022-06-13 RX ORDER — DICYCLOMINE HCL 20 MG
20 TABLET ORAL EVERY 6 HOURS
Qty: 90 TABLET | Refills: 1 | Status: SHIPPED | OUTPATIENT
Start: 2022-06-13 | End: 2022-08-03

## 2022-06-13 NOTE — TELEPHONE ENCOUNTER
ptn finished his protonix and is on a low carb diet  He has been a reduction in bloating but is still having epigastric pain and was while on the protonix   Dr Linda Salgado please advise

## 2022-06-20 ENCOUNTER — HOSPITAL ENCOUNTER (OUTPATIENT)
Dept: NUCLEAR MEDICINE | Facility: HOSPITAL | Age: 57
Discharge: HOME | End: 2022-06-20
Attending: INTERNAL MEDICINE
Payer: COMMERCIAL

## 2022-06-20 DIAGNOSIS — R14.0 BLOATING: ICD-10-CM

## 2022-06-20 DIAGNOSIS — R10.13 EPIGASTRIC PAIN: ICD-10-CM

## 2022-06-20 DIAGNOSIS — R10.11 RIGHT UPPER QUADRANT ABDOMINAL PAIN: ICD-10-CM

## 2022-06-20 PROCEDURE — G1004 CDSM NDSC: HCPCS

## 2022-06-20 PROCEDURE — A9537 TC99M MEBROFENIN: HCPCS

## 2022-06-20 PROCEDURE — 78227 HEPATOBIL SYST IMAGE W/DRUG: CPT

## 2022-07-26 ENCOUNTER — APPOINTMENT (OUTPATIENT)
Dept: LAB | Facility: CLINIC | Age: 57
End: 2022-07-26
Payer: COMMERCIAL

## 2022-07-26 DIAGNOSIS — R14.0 BLOATING: ICD-10-CM

## 2022-07-26 DIAGNOSIS — E55.9 VITAMIN D DEFICIENCY: ICD-10-CM

## 2022-07-26 DIAGNOSIS — E78.2 MIXED HYPERLIPIDEMIA: ICD-10-CM

## 2022-07-26 DIAGNOSIS — R10.13 EPIGASTRIC PAIN: ICD-10-CM

## 2022-07-26 DIAGNOSIS — R73.9 HYPERGLYCEMIA: ICD-10-CM

## 2022-07-26 DIAGNOSIS — R10.11 RIGHT UPPER QUADRANT ABDOMINAL PAIN: ICD-10-CM

## 2022-07-26 LAB
25(OH)D3 SERPL-MCNC: 48.5 NG/ML (ref 30–100)
ALBUMIN SERPL BCP-MCNC: 3.8 G/DL (ref 3.5–5)
ALP SERPL-CCNC: 84 U/L (ref 46–116)
ALT SERPL W P-5'-P-CCNC: 38 U/L (ref 12–78)
ANION GAP SERPL CALCULATED.3IONS-SCNC: 5 MMOL/L (ref 4–13)
AST SERPL W P-5'-P-CCNC: 27 U/L (ref 5–45)
BASOPHILS # BLD AUTO: 0.05 THOUSANDS/ΜL (ref 0–0.1)
BASOPHILS NFR BLD AUTO: 2 % (ref 0–1)
BILIRUB DIRECT SERPL-MCNC: 0.22 MG/DL (ref 0–0.2)
BILIRUB SERPL-MCNC: 0.98 MG/DL (ref 0.2–1)
BUN SERPL-MCNC: 12 MG/DL (ref 5–25)
CALCIUM SERPL-MCNC: 9.2 MG/DL (ref 8.3–10.1)
CHLORIDE SERPL-SCNC: 107 MMOL/L (ref 96–108)
CHOLEST SERPL-MCNC: 192 MG/DL
CO2 SERPL-SCNC: 27 MMOL/L (ref 21–32)
CREAT SERPL-MCNC: 1.24 MG/DL (ref 0.6–1.3)
EOSINOPHIL # BLD AUTO: 0.19 THOUSAND/ΜL (ref 0–0.61)
EOSINOPHIL NFR BLD AUTO: 6 % (ref 0–6)
ERYTHROCYTE [DISTWIDTH] IN BLOOD BY AUTOMATED COUNT: 13.2 % (ref 11.6–15.1)
GFR SERPL CREATININE-BSD FRML MDRD: 63 ML/MIN/1.73SQ M
GLUCOSE P FAST SERPL-MCNC: 106 MG/DL (ref 65–99)
HCT VFR BLD AUTO: 40.6 % (ref 36.5–49.3)
HDLC SERPL-MCNC: 74 MG/DL
HGB BLD-MCNC: 14.2 G/DL (ref 12–17)
IMM GRANULOCYTES # BLD AUTO: 0.01 THOUSAND/UL (ref 0–0.2)
IMM GRANULOCYTES NFR BLD AUTO: 0 % (ref 0–2)
LDLC SERPL CALC-MCNC: 92 MG/DL (ref 0–100)
LYMPHOCYTES # BLD AUTO: 0.53 THOUSANDS/ΜL (ref 0.6–4.47)
LYMPHOCYTES NFR BLD AUTO: 15 % (ref 14–44)
MCH RBC QN AUTO: 31.9 PG (ref 26.8–34.3)
MCHC RBC AUTO-ENTMCNC: 35 G/DL (ref 31.4–37.4)
MCV RBC AUTO: 91 FL (ref 82–98)
MONOCYTES # BLD AUTO: 0.45 THOUSAND/ΜL (ref 0.17–1.22)
MONOCYTES NFR BLD AUTO: 13 % (ref 4–12)
NEUTROPHILS # BLD AUTO: 2.21 THOUSANDS/ΜL (ref 1.85–7.62)
NEUTS SEG NFR BLD AUTO: 64 % (ref 43–75)
NONHDLC SERPL-MCNC: 118 MG/DL
NRBC BLD AUTO-RTO: 0 /100 WBCS
PLATELET # BLD AUTO: 199 THOUSANDS/UL (ref 149–390)
PMV BLD AUTO: 9.7 FL (ref 8.9–12.7)
POTASSIUM SERPL-SCNC: 3.6 MMOL/L (ref 3.5–5.3)
PROT SERPL-MCNC: 7.9 G/DL (ref 6.4–8.4)
RBC # BLD AUTO: 4.45 MILLION/UL (ref 3.88–5.62)
SODIUM SERPL-SCNC: 139 MMOL/L (ref 135–147)
TRIGL SERPL-MCNC: 128 MG/DL
TSH SERPL DL<=0.05 MIU/L-ACNC: 3.83 UIU/ML (ref 0.45–4.5)
WBC # BLD AUTO: 3.44 THOUSAND/UL (ref 4.31–10.16)

## 2022-07-26 PROCEDURE — 85025 COMPLETE CBC W/AUTO DIFF WBC: CPT

## 2022-07-26 PROCEDURE — 80053 COMPREHEN METABOLIC PANEL: CPT

## 2022-07-26 PROCEDURE — 82306 VITAMIN D 25 HYDROXY: CPT

## 2022-07-26 PROCEDURE — 36415 COLL VENOUS BLD VENIPUNCTURE: CPT

## 2022-07-26 PROCEDURE — 84443 ASSAY THYROID STIM HORMONE: CPT

## 2022-07-26 PROCEDURE — 82248 BILIRUBIN DIRECT: CPT

## 2022-07-26 PROCEDURE — 83036 HEMOGLOBIN GLYCOSYLATED A1C: CPT

## 2022-07-26 PROCEDURE — 80061 LIPID PANEL: CPT

## 2022-07-27 LAB
EST. AVERAGE GLUCOSE BLD GHB EST-MCNC: 103 MG/DL
HBA1C MFR BLD: 5.2 %

## 2022-08-03 ENCOUNTER — OFFICE VISIT (OUTPATIENT)
Dept: INTERNAL MEDICINE CLINIC | Facility: CLINIC | Age: 57
End: 2022-08-03
Payer: COMMERCIAL

## 2022-08-03 VITALS
BODY MASS INDEX: 25.62 KG/M2 | SYSTOLIC BLOOD PRESSURE: 138 MMHG | HEART RATE: 100 BPM | OXYGEN SATURATION: 96 % | RESPIRATION RATE: 18 BRPM | HEIGHT: 67 IN | TEMPERATURE: 98.3 F | DIASTOLIC BLOOD PRESSURE: 92 MMHG | WEIGHT: 163.2 LBS

## 2022-08-03 DIAGNOSIS — E78.2 MIXED HYPERLIPIDEMIA: ICD-10-CM

## 2022-08-03 DIAGNOSIS — I10 BENIGN ESSENTIAL HYPERTENSION: Primary | ICD-10-CM

## 2022-08-03 DIAGNOSIS — R73.9 HYPERGLYCEMIA: ICD-10-CM

## 2022-08-03 DIAGNOSIS — E03.9 ACQUIRED HYPOTHYROIDISM: ICD-10-CM

## 2022-08-03 DIAGNOSIS — J45.30 MILD PERSISTENT ASTHMA WITHOUT COMPLICATION: ICD-10-CM

## 2022-08-03 PROCEDURE — 99214 OFFICE O/P EST MOD 30 MIN: CPT | Performed by: INTERNAL MEDICINE

## 2022-08-03 NOTE — PROGRESS NOTES
INTERNAL MEDICINE OFFICE VISIT  [facility]  [address], [facility], [address] [facility]  Tel: [phone]      NAME: Eveline Russell  AGE: 61 y o  SEX: male  : 1965   MRN: [id number]    DATE: 8/3/2022  TIME: 12:53 PM      Assessment and Plan:  1  Benign essential hypertension    Continue amlodipine 10 mg daily  Was advised to cut down on the salt intake in the diet  He will come back in 3 months for recheck and if it is still, will to add medication    2  Mixed hyperlipidemia   continue atorvastatin  - CBC and differential; Future  - Comprehensive metabolic panel; Future  - Lipid panel; Future  - TSH, 3rd generation; Future    3  Hyperglycemia   was advised to cut down on the carbohydrates in the diet  - Hemoglobin A1C; Future    4  Acquired hypothyroidism   follow-up TSH levels    5  Mild persistent asthma without complication   continue inhalers as needed      - Counseling Documentation: patient was counseled regarding: diagnostic results, instructions for management, risk factor reductions, prognosis, patient and family education, risks and benefits of treatment options and importance of compliance with treatment  - Medication Side Effects: Adverse side effects of medications were reviewed with the patient/guardian today  Return for follow up visit in  3 months or earlier, if needed        Chief Complaint:  Chief Complaint   Patient presents with    Follow-up     4 month w labs          History of Present Illness:    blood pressure is borderline high despite taking medication regularly  Cholesterol is stable on atorvastatin   A1c is very well controlled on  TSH is stable         Active Problem List:  Patient Active Problem List   Diagnosis    Allergic rhinitis    Benign essential hypertension    Dermatitis    Cervical radiculopathy    Lumbar radiculopathy    WBC disease    Mild persistent asthma without complication    Overweight    Lateral epicondylitis of right elbow    Asthma    Hyperglycemia    Mixed hyperlipidemia    Acquired hypothyroidism    Vitamin D deficiency         Past Medical History:  Past Medical History:   Diagnosis Date    Allergic conjunctivitis     Resolved 12/18/2017     Asthma     Disease of thyroid gland     Hypertension          Past Surgical History:  Past Surgical History:   Procedure Laterality Date    COLONOSCOPY  2021    FOOT ARTHRODESIS           Family History:  Family History   Problem Relation Age of Onset    Hyperlipidemia Mother     Hypertension Mother          Social History:  Social History     Socioeconomic History    Marital status: Single     Spouse name: None    Number of children: None    Years of education: None    Highest education level: None   Occupational History    None   Tobacco Use    Smoking status: Never Smoker    Smokeless tobacco: Never Used   Vaping Use    Vaping Use: Never used   Substance and Sexual Activity    Alcohol use: Yes     Alcohol/week: 2 0 standard drinks     Types: 2 Standard drinks or equivalent per week     Comment: Social     Drug use: Yes     Types: Marijuana     Comment: socially    Sexual activity: Yes     Partners: Female   Other Topics Concern    None   Social History Narrative    None     Social Determinants of Health     Financial Resource Strain: Not on file   Food Insecurity: Not on file   Transportation Needs: Not on file   Physical Activity: Not on file   Stress: Not on file   Social Connections: Not on file   Intimate Partner Violence: Not on file   Housing Stability: Not on file         Allergies:   Allergies   Allergen Reactions    Cat Hair Extract     Dog Epithelium     Dust Mite Extract     Molds & Smuts     Other     Pollen Extract     Dario Grass Pollen Allergen          Medications:    Current Outpatient Medications:     albuterol (PROVENTIL HFA,VENTOLIN HFA) 90 mcg/act inhaler, Inhale 2 puffs every 6 (six) hours as needed for wheezing or shortness of breath, Disp: 1 Inhaler, Rfl: 5    amLODIPine (NORVASC) 10 mg tablet, Take 1 tablet (10 mg total) by mouth daily, Disp: 90 tablet, Rfl: 1    atorvastatin (LIPITOR) 20 mg tablet, Take 1 tablet (20 mg total) by mouth daily, Disp: 90 tablet, Rfl: 3    Cetirizine HCl (ZyrTEC Allergy) 10 MG CAPS, Take by mouth, Disp: , Rfl:     Famotidine (PEPCID PO), Take by mouth (Patient not taking: Reported on 8/3/2022), Disp: , Rfl:       The following portions of the patient's history were reviewed and updated as appropriate: past medical history, past surgical history, family history, social history, allergies, current medications and active problem list       Review of Systems:  Constitutional: Denies fever, chills, weight gain, weight loss, fatigue  Eyes: Denies eye redness, eye discharge, double vision, change in visual acuity  ENT: Denies hearing loss, tinnitus, sneezing, nasal congestion, nasal discharge, sore throat   Respiratory: Denies cough, expectoration, hemoptysis, shortness of breath, wheezing  Cardiovascular: Denies chest pain, palpitations, lower extremity swelling, orthopnea, PND  Gastrointestinal: Denies abdominal pain, heartburn, nausea, vomiting, hematemesis, diarrhea, bloody stools  Genito-Urinary: Denies dysuria, frequency, difficulty in micturition, nocturia, incontinence  Musculoskeletal: Denies back pain, joint pain, muscle pain  Neurologic: Denies confusion, lightheadedness, syncope, headache, focal weakness, sensory changes, seizures  Endocrine: Denies polyuria, polydipsia, temperature intolerance  Allergy and Immunology: Denies hives, insect bite sensitivity  Hematological and Lymphatic: Denies bleeding problems, swollen glands   Psychological: Denies depression, suicidal ideation, anxiety, panic, mood swings  Dermatological: Denies pruritus, rash, skin lesion changes      Vitals:  Vitals:    08/03/22 1239   BP: 138/92   Pulse: 100   Resp: 18   Temp: 98 3 °F (36 8 °C)   SpO2: 96%       Body mass index is 25 56 kg/m²  Weight (last 2 days)     Date/Time Weight    08/03/22 1239 74 (163 2)            Physical Examination:  General: Patient is not in acute distress  Awake, alert, responding to commands  No weight gain or loss  Head: Normocephalic  Atraumatic  Eyes: Conjunctiva and lids with no swelling, erythema or discharge  Both pupils normal sized, round and reactive to light  Sclera nonicteric  ENT: External examination of nose and ear normal  Otoscopic examination shows translucent tympanic membranes with patent canals without erythema  Oropharynx moist with no erythema, edema, exudate or lesions  Neck: Supple  JVP not raised  Trachea midline  No masses  No thyromegaly  Lungs: No signs of increased work of breathing or respiratory distress  Bilateral bronchovascular breath sounds with no crackles or rhonchi  Chest wall: No tenderness  Cardiovascular: Normal PMI  No thrills  Regular rate and rhythm  S1 and S2 normal  No murmur, rub or gallop  Gastrointestinal: Abdomen soft, nontender  No guarding or rigidity  Liver and spleen not palpable  Bowel sounds present  Neurologic: Cranial nerves II-XII intact   Cortical functions normal  Motor system - Reflexes 2+ and symmetrical  Sensations normal  Musculoskeletal: Gait normal  No joint tenderness  Integumentary: Skin normal with no rash or lesions  Lymphatic: No palpable lymph nodes in neck, axilla or groin  Extremities: No clubbing, cyanosis, edema or varicosities  Psychological: Judgement and insight normal  Mood and affect normal      Laboratory Results:  CBC with diff:   Lab Results   Component Value Date    WBC 3 44 (L) 07/26/2022    RBC 4 45 07/26/2022    HGB 14 2 07/26/2022    HCT 40 6 07/26/2022    MCV 91 07/26/2022    MCH 31 9 07/26/2022    RDW 13 2 07/26/2022     07/26/2022       CMP:  Lab Results   Component Value Date    CREATININE 1 24 07/26/2022    BUN 12 07/26/2022    K 3 6 07/26/2022     07/26/2022    CO2 27 07/26/2022    ALKPHOS 84 07/26/2022    ALT 38 07/26/2022    AST 27 07/26/2022    BILIDIR 0 22 (H) 07/26/2022       Lab Results   Component Value Date    HGBA1C 5 2 07/26/2022       No results found for: TROPONINI, CKMB, CKTOTAL    Lipid Profile:   No results found for: CHOL  Lab Results   Component Value Date    HDL 74 07/26/2022    HDL 62 03/25/2022     Lab Results   Component Value Date    LDLCALC 92 07/26/2022    LDLCALC 96 03/25/2022     Lab Results   Component Value Date    TRIG 128 07/26/2022    TRIG 182 (H) 03/25/2022       Imaging Results:  NM hepatobiliary w rx  Narrative: HEPATOBILIARY SCAN WITH CHOLECYSTOKININ ADMINISTRATION     INDICATION: R10 13: Epigastric pain  R10 11: Right upper quadrant pain  R14 0: Abdominal distension (gaseous)    COMPARISON:  Ultrasound 11/3/2021    TECHNIQUE:   Following the intravenous administration of 4 9 mCi Tc-99m labeled mebrofenin, dynamic abdominal images were obtained up to a 60 minute time period  Images were performed in AP projection  FINDINGS:     There is prompt, uniform accumulation with normal clearance of the radiopharmaceutical by the liver  There is normal visualization of the common bile duct and small bowel  Gallbladder is visualized although somewhat delayed visualization of occurring   until approximately 50 minutes  In order to evaluate the contractile response of the gallbladder to  cholecystokinin stimulation, 1 5 mcg sincalide (0 02 mcg/kg) was mixed with saline for a total of 60 cc and administered by slow intravenous infusion up to 60 minutes  These images   demonstrate normal contraction of the gallbladder  The calculated gallbladder ejection fraction is 94 % (N = >38%)  The patient experienced no symptoms after CCK administration  Impression: 1  Mildly delayed visualization of the gallbladder occurring at approximately 50 minutes    2  However, there was a normal contractile response of the gallbladder to cholecystokinin infusion   (94%), without symptomatology    Workstation performed: [id number]       Health Maintenance:  Health Maintenance   Topic Date Due    Hepatitis C Screening  Never done    Pneumococcal Vaccine: Pediatrics (0 to 5 Years) and At-Risk Patients (6 to 59 Years) (1 - PCV) Never done    HIV Screening  Never done    Annual Physical  Never done    DTaP,Tdap,and Td Vaccines (1 - Tdap) 07/18/2003    PT PLAN OF CARE  09/22/2021    COVID-19 Vaccine (3 - Booster for Pfizer series) 10/28/2021    Influenza Vaccine (1) 09/01/2022    Depression Screening  03/29/2023    BMI: Followup Plan  03/29/2023    BMI: Adult  08/03/2023    Colorectal Cancer Screening  04/19/2026    HIB Vaccine  Aged Out    Hepatitis B Vaccine  Aged Out    IPV Vaccine  Aged Out    Hepatitis A Vaccine  Aged Out    Meningococcal ACWY Vaccine  Aged Out    HPV Vaccine  Aged Out     Immunization History   Administered Date(s) Administered    COVID-19 PFIZER VACCINE 0 3 ML IM 05/01/2021, 05/28/2021    Hep B, adult 11/08/2004    Td (adult), Unspecified 07/17/2003         Jeffrey Reyes MD  6/4/9978,20:92 PM

## 2022-11-02 ENCOUNTER — TELEPHONE (OUTPATIENT)
Dept: GASTROENTEROLOGY | Facility: AMBULARY SURGERY CENTER | Age: 57
End: 2022-11-02

## 2022-11-02 DIAGNOSIS — R14.0 BLOATING: ICD-10-CM

## 2022-11-02 DIAGNOSIS — R10.13 EPIGASTRIC PAIN: ICD-10-CM

## 2022-11-02 DIAGNOSIS — K22.10 EROSIVE ESOPHAGITIS: Primary | ICD-10-CM

## 2022-11-02 RX ORDER — PANTOPRAZOLE SODIUM 40 MG/1
40 TABLET, DELAYED RELEASE ORAL
Qty: 90 TABLET | Refills: 1 | Status: SHIPPED | OUTPATIENT
Start: 2022-11-02

## 2022-11-02 NOTE — TELEPHONE ENCOUNTER
Please sign pt has been off the medication for a few months was doing well symptoms have returned and would like a refill on pantoprazole

## 2022-11-02 NOTE — TELEPHONE ENCOUNTER
Patients GI provider:  Dr Gaviota Marcum     Number to return call: [phone]     Reason for call: Pt calling requesting to speak with a nurse regarding a refill of pantoprazole       Scheduled procedure/appointment date if applicable: Apt/procedure

## 2022-11-04 ENCOUNTER — APPOINTMENT (OUTPATIENT)
Dept: LAB | Facility: CLINIC | Age: 57
End: 2022-11-04

## 2022-11-04 DIAGNOSIS — R73.9 HYPERGLYCEMIA: ICD-10-CM

## 2022-11-04 DIAGNOSIS — E78.2 MIXED HYPERLIPIDEMIA: ICD-10-CM

## 2022-11-04 LAB
ALBUMIN SERPL BCP-MCNC: 3.8 G/DL (ref 3.5–5)
ALP SERPL-CCNC: 87 U/L (ref 46–116)
ALT SERPL W P-5'-P-CCNC: 55 U/L (ref 12–78)
ANION GAP SERPL CALCULATED.3IONS-SCNC: 7 MMOL/L (ref 4–13)
AST SERPL W P-5'-P-CCNC: 45 U/L (ref 5–45)
BASOPHILS # BLD AUTO: 0.06 THOUSANDS/ÂΜL (ref 0–0.1)
BASOPHILS NFR BLD AUTO: 2 % (ref 0–1)
BILIRUB SERPL-MCNC: 1.06 MG/DL (ref 0.2–1)
BUN SERPL-MCNC: 18 MG/DL (ref 5–25)
CALCIUM SERPL-MCNC: 9.5 MG/DL (ref 8.3–10.1)
CHLORIDE SERPL-SCNC: 105 MMOL/L (ref 96–108)
CHOLEST SERPL-MCNC: 210 MG/DL
CO2 SERPL-SCNC: 25 MMOL/L (ref 21–32)
CREAT SERPL-MCNC: 1.29 MG/DL (ref 0.6–1.3)
EOSINOPHIL # BLD AUTO: 0.23 THOUSAND/ÂΜL (ref 0–0.61)
EOSINOPHIL NFR BLD AUTO: 6 % (ref 0–6)
ERYTHROCYTE [DISTWIDTH] IN BLOOD BY AUTOMATED COUNT: 13.2 % (ref 11.6–15.1)
EST. AVERAGE GLUCOSE BLD GHB EST-MCNC: 100 MG/DL
GFR SERPL CREATININE-BSD FRML MDRD: 60 ML/MIN/1.73SQ M
GLUCOSE P FAST SERPL-MCNC: 111 MG/DL (ref 65–99)
HBA1C MFR BLD: 5.1 %
HCT VFR BLD AUTO: 42.3 % (ref 36.5–49.3)
HDLC SERPL-MCNC: 99 MG/DL
HGB BLD-MCNC: 15.2 G/DL (ref 12–17)
IMM GRANULOCYTES # BLD AUTO: 0.01 THOUSAND/UL (ref 0–0.2)
IMM GRANULOCYTES NFR BLD AUTO: 0 % (ref 0–2)
LDLC SERPL CALC-MCNC: 81 MG/DL (ref 0–100)
LYMPHOCYTES # BLD AUTO: 0.51 THOUSANDS/ÂΜL (ref 0.6–4.47)
LYMPHOCYTES NFR BLD AUTO: 13 % (ref 14–44)
MCH RBC QN AUTO: 33.9 PG (ref 26.8–34.3)
MCHC RBC AUTO-ENTMCNC: 35.9 G/DL (ref 31.4–37.4)
MCV RBC AUTO: 94 FL (ref 82–98)
MONOCYTES # BLD AUTO: 0.49 THOUSAND/ÂΜL (ref 0.17–1.22)
MONOCYTES NFR BLD AUTO: 13 % (ref 4–12)
NEUTROPHILS # BLD AUTO: 2.59 THOUSANDS/ÂΜL (ref 1.85–7.62)
NEUTS SEG NFR BLD AUTO: 66 % (ref 43–75)
NONHDLC SERPL-MCNC: 111 MG/DL
NRBC BLD AUTO-RTO: 0 /100 WBCS
PLATELET # BLD AUTO: 191 THOUSANDS/UL (ref 149–390)
PMV BLD AUTO: 9.3 FL (ref 8.9–12.7)
POTASSIUM SERPL-SCNC: 3.6 MMOL/L (ref 3.5–5.3)
PROT SERPL-MCNC: 8.1 G/DL (ref 6.4–8.4)
RBC # BLD AUTO: 4.49 MILLION/UL (ref 3.88–5.62)
SODIUM SERPL-SCNC: 137 MMOL/L (ref 135–147)
TRIGL SERPL-MCNC: 152 MG/DL
TSH SERPL DL<=0.05 MIU/L-ACNC: 3.11 UIU/ML (ref 0.45–4.5)
WBC # BLD AUTO: 3.89 THOUSAND/UL (ref 4.31–10.16)

## 2022-11-09 ENCOUNTER — OFFICE VISIT (OUTPATIENT)
Dept: INTERNAL MEDICINE CLINIC | Facility: CLINIC | Age: 57
End: 2022-11-09

## 2022-11-09 VITALS
RESPIRATION RATE: 18 BRPM | DIASTOLIC BLOOD PRESSURE: 96 MMHG | WEIGHT: 167 LBS | SYSTOLIC BLOOD PRESSURE: 130 MMHG | HEIGHT: 67 IN | BODY MASS INDEX: 26.21 KG/M2 | HEART RATE: 105 BPM | TEMPERATURE: 98.1 F | OXYGEN SATURATION: 99 %

## 2022-11-09 DIAGNOSIS — E55.9 VITAMIN D DEFICIENCY: ICD-10-CM

## 2022-11-09 DIAGNOSIS — I10 BENIGN ESSENTIAL HYPERTENSION: Primary | ICD-10-CM

## 2022-11-09 DIAGNOSIS — Z11.4 SCREENING FOR HIV (HUMAN IMMUNODEFICIENCY VIRUS): ICD-10-CM

## 2022-11-09 DIAGNOSIS — E78.2 MIXED HYPERLIPIDEMIA: ICD-10-CM

## 2022-11-09 DIAGNOSIS — R73.9 HYPERGLYCEMIA: ICD-10-CM

## 2022-11-09 DIAGNOSIS — J45.30 MILD PERSISTENT ASTHMA WITHOUT COMPLICATION: ICD-10-CM

## 2022-11-09 DIAGNOSIS — Z11.59 NEED FOR HEPATITIS C SCREENING TEST: ICD-10-CM

## 2022-11-09 RX ORDER — METOPROLOL SUCCINATE 25 MG/1
25 TABLET, EXTENDED RELEASE ORAL DAILY
Qty: 90 TABLET | Refills: 1 | Status: SHIPPED | OUTPATIENT
Start: 2022-11-09

## 2022-11-09 NOTE — PROGRESS NOTES
INTERNAL MEDICINE OFFICE VISIT  [facility] AT [facility]  [facility] [address], [address]  Tel: [phone]      NAME: Lucinda Katz  AGE: 61 y o  SEX: male  : 1965   MRN: [id number]    DATE: 2022  TIME: 4:13 PM      Assessment and Plan:  1  Benign essential hypertension   blood pressure has been running high in the last few visits  He continues to take the amlodipine 10 mg daily  Was told to add metoprolol 25 mg as his heart rate has also been high and metoprolol will take care of the blood pressure as well as the heart rate  I will see him back in 1 month    - metoprolol succinate (TOPROL-XL) 25 mg 24 hr tablet; Take 1 tablet (25 mg total) by mouth daily  Dispense: 90 tablet; Refill: 1    2  Mixed hyperlipidemia   continue atorvastatin  - CBC and differential; Future  - Comprehensive metabolic panel; Future  - Lipid panel; Future  - TSH, 3rd generation; Future    3  Hyperglycemia   was advised to cut down on the carbohydrates  - Hemoglobin A1C; Future    4  Mild persistent asthma without complication   continue albuterol inhaler as needed    5  Vitamin D deficiency    - Vitamin D 25 hydroxy; Future    6  Screening for HIV (human immunodeficiency virus)    - HIV 1/2 Antigen/Antibody (4th Generation) w Reflex [facility]; Future    7  Need for hepatitis C screening test    - Hepatitis C Antibody ([facility]); Future      - Counseling Documentation: patient was counseled regarding: diagnostic results, instructions for management, risk factor reductions, prognosis, patient and family education, risks and benefits of treatment options and importance of compliance with treatment  - Medication Side Effects: Adverse side effects of medications were reviewed with the patient/guardian today  Return for follow up visit in  1 month or earlier, if needed        Chief Complaint:  Chief Complaint   Patient presents with   • Follow-up     3 month w labs History of Present Illness:    blood pressure has been running high even though he takes his medication regularly  He may be eating a lot of cheese recently as he is on a low-carbohydrate diet  Takes atorvastatin and cholesterol is stable   A1c is also within normal range   Asthma is stable      Active Problem List:  Patient Active Problem List   Diagnosis   • Allergic rhinitis   • Benign essential hypertension   • Dermatitis   • Cervical radiculopathy   • Lumbar radiculopathy   • WBC disease   • Mild persistent asthma without complication   • Overweight   • Lateral epicondylitis of right elbow   • Hyperglycemia   • Mixed hyperlipidemia   • Acquired hypothyroidism   • Vitamin D deficiency         Past Medical History:  Past Medical History:   Diagnosis Date   • Allergic conjunctivitis     Resolved 12/18/2017    • Asthma    • Disease of thyroid gland    • Hypertension          Past Surgical History:  Past Surgical History:   Procedure Laterality Date   • COLONOSCOPY  2021   • FOOT ARTHRODESIS           Family History:  Family History   Problem Relation Age of Onset   • Hyperlipidemia Mother    • Hypertension Mother          Social History:  Social History     Socioeconomic History   • Marital status: Single     Spouse name: None   • Number of children: None   • Years of education: None   • Highest education level: None   Occupational History   • None   Tobacco Use   • Smoking status: Never Smoker   • Smokeless tobacco: Never Used   Vaping Use   • Vaping Use: Never used   Substance and Sexual Activity   • Alcohol use:  Yes     Alcohol/week: 2 0 standard drinks     Types: 2 Standard drinks or equivalent per week     Comment: Social    • Drug use: Yes     Types: Marijuana     Comment: socially   • Sexual activity: Yes     Partners: Female   Other Topics Concern   • None   Social History Narrative   • None     Social Determinants of Health     Financial Resource Strain: Not on file   Food Insecurity: Not on file Transportation Needs: Not on file   Physical Activity: Not on file   Stress: Not on file   Social Connections: Not on file   Intimate Partner Violence: Not on file   Housing Stability: Not on file         Allergies:   Allergies   Allergen Reactions   • Cat Hair Extract    • Dog Epithelium    • Dust Mite Extract    • Molds & Smuts    • Other    • Pollen Extract    • Dario Grass Pollen Allergen          Medications:    Current Outpatient Medications:   •  albuterol (PROVENTIL HFA,VENTOLIN HFA) 90 mcg/act inhaler, Inhale 2 puffs every 6 (six) hours as needed for wheezing or shortness of breath, Disp: 1 Inhaler, Rfl: 5  •  atorvastatin (LIPITOR) 20 mg tablet, Take 1 tablet (20 mg total) by mouth daily, Disp: 90 tablet, Rfl: 3  •  Cetirizine HCl (ZyrTEC Allergy) 10 MG CAPS, Take by mouth, Disp: , Rfl:   •  metoprolol succinate (TOPROL-XL) 25 mg 24 hr tablet, Take 1 tablet (25 mg total) by mouth daily, Disp: 90 tablet, Rfl: 1  •  pantoprazole (PROTONIX) 40 mg tablet, Take 1 tablet (40 mg total) by mouth daily before breakfast, Disp: 90 tablet, Rfl: 1  •  amLODIPine (NORVASC) 10 mg tablet, Take 1 tablet (10 mg total) by mouth daily, Disp: 90 tablet, Rfl: 1      The following portions of the patient's history were reviewed and updated as appropriate: past medical history, past surgical history, family history, social history, allergies, current medications and active problem list       Review of Systems:  Constitutional: Denies fever, chills, weight gain, weight loss, fatigue  Eyes: Denies eye redness, eye discharge, double vision, change in visual acuity  ENT: Denies hearing loss, tinnitus, sneezing, nasal congestion, nasal discharge, sore throat   Respiratory: Denies cough, expectoration, hemoptysis, shortness of breath, wheezing  Cardiovascular: Denies chest pain, palpitations, lower extremity swelling, orthopnea, PND  Gastrointestinal: Denies abdominal pain, heartburn, nausea, vomiting, hematemesis, diarrhea, bloody stools  Genito-Urinary: Denies dysuria, frequency, difficulty in micturition, nocturia, incontinence  Musculoskeletal: Denies back pain, joint pain, muscle pain  Neurologic: Denies confusion, lightheadedness, syncope, headache, focal weakness, sensory changes, seizures  Endocrine: Denies polyuria, polydipsia, temperature intolerance  Allergy and Immunology: Denies hives, insect bite sensitivity  Hematological and Lymphatic: Denies bleeding problems, swollen glands   Psychological: Denies depression, suicidal ideation, anxiety, panic, mood swings  Dermatological: Denies pruritus, rash, skin lesion changes      Vitals:  Vitals:    11/09/22 1610   BP: 130/96   Pulse:    Resp:    Temp:    SpO2:        Body mass index is 26 16 kg/m²  Weight (last 2 days)     Date/Time Weight    11/09/22 1546 75 8 (167)            Physical Examination:  General: Patient is not in acute distress  Awake, alert, responding to commands  No weight gain or loss  Head: Normocephalic  Atraumatic  Eyes: Conjunctiva and lids with no swelling, erythema or discharge  Both pupils normal sized, round and reactive to light  Sclera nonicteric  ENT: External examination of nose and ear normal  Otoscopic examination shows translucent tympanic membranes with patent canals without erythema  Oropharynx moist with no erythema, edema, exudate or lesions  Neck: Supple  JVP not raised  Trachea midline  No masses  No thyromegaly  Lungs: No signs of increased work of breathing or respiratory distress  Bilateral bronchovascular breath sounds with no crackles or rhonchi  Chest wall: No tenderness  Cardiovascular: Normal PMI  No thrills  Regular rate and rhythm  S1 and S2 normal  No murmur, rub or gallop  Gastrointestinal: Abdomen soft, nontender  No guarding or rigidity  Liver and spleen not palpable  Bowel sounds present  Neurologic: Cranial nerves II-XII intact   Cortical functions normal  Motor system - Reflexes 2+ and symmetrical  Sensations normal  Musculoskeletal: Gait normal  No joint tenderness  Integumentary: Skin normal with no rash or lesions  Lymphatic: No palpable lymph nodes in neck, axilla or groin  Extremities: No clubbing, cyanosis, edema or varicosities  Psychological: Judgement and insight normal  Mood and affect normal      Laboratory Results:  CBC with diff:   Lab Results   Component Value Date    WBC 3 89 (L) 11/04/2022    RBC 4 49 11/04/2022    HGB 15 2 11/04/2022    HCT 42 3 11/04/2022    MCV 94 11/04/2022    MCH 33 9 11/04/2022    RDW 13 2 11/04/2022     11/04/2022       CMP:  Lab Results   Component Value Date    CREATININE 1 29 11/04/2022    BUN 18 11/04/2022    K 3 6 11/04/2022     11/04/2022    CO2 25 11/04/2022    ALKPHOS 87 11/04/2022    ALT 55 11/04/2022    AST 45 11/04/2022    BILIDIR 0 22 (H) 07/26/2022       Lab Results   Component Value Date    HGBA1C 5 1 11/04/2022       No results found for: TROPONINI, CKMB, CKTOTAL    Lipid Profile:   No results found for: CHOL  Lab Results   Component Value Date    HDL 99 11/04/2022    HDL 74 07/26/2022     Lab Results   Component Value Date    LDLCALC 81 11/04/2022    [address] 92 07/26/2022     Lab Results   Component Value Date    TRIG 152 (H) 11/04/2022    TRIG 128 07/26/2022       Imaging Results:  NM hepatobiliary w rx  Narrative: HEPATOBILIARY SCAN WITH CHOLECYSTOKININ ADMINISTRATION     INDICATION: R10 13: Epigastric pain  R10 11: Right upper quadrant pain  R14 0: Abdominal distension (gaseous)    COMPARISON:  Ultrasound 11/3/2021    TECHNIQUE:   Following the intravenous administration of 4 9 mCi Tc-99m labeled mebrofenin, dynamic abdominal images were obtained up to a 60 minute time period  Images were performed in AP projection  FINDINGS:     There is prompt, uniform accumulation with normal clearance of the radiopharmaceutical by the liver  There is normal visualization of the common bile duct and small bowel    Gallbladder is visualized although somewhat delayed visualization of occurring   until approximately 50 minutes  In order to evaluate the contractile response of the gallbladder to  cholecystokinin stimulation, 1 5 mcg sincalide (0 02 mcg/kg) was mixed with saline for a total of 60 cc and administered by slow intravenous infusion up to 60 minutes  These images   demonstrate normal contraction of the gallbladder  The calculated gallbladder ejection fraction is 94 % (N = >38%)  The patient experienced no symptoms after CCK administration  Impression: 1  Mildly delayed visualization of the gallbladder occurring at approximately 50 minutes    2  However, there was a normal contractile response of the gallbladder to cholecystokinin infusion   (94%), without symptomatology    Workstation performed: [id number]       Health Maintenance:  Health Maintenance   Topic Date Due   • Hepatitis C Screening  Never done   • Pneumococcal Vaccine: Pediatrics (0 to 5 Years) and At-Risk Patients (6 to 59 Years) (1 - PCV) Never done   • HIV Screening  Never done   • Annual Physical  Never done   • DTaP,Tdap,and Td Vaccines (1 - Tdap) 07/18/2003   • PT PLAN OF CARE  09/22/2021   • COVID-19 Vaccine (3 - Booster for Pfizer series) 10/28/2021   • Influenza Vaccine (1) Never done   • Depression Screening  03/29/2023   • BMI: Followup Plan  03/29/2023   • BMI: Adult  11/09/2023   • Colorectal Cancer Screening  04/19/2026   • HIB Vaccine  Aged Out   • Hepatitis B Vaccine  Aged Out   • IPV Vaccine  Aged Out   • Hepatitis A Vaccine  Aged Out   • Meningococcal ACWY Vaccine  Aged Out   • HPV Vaccine  Aged Out     Immunization History   Administered Date(s) Administered   • COVID-19 PFIZER VACCINE 0 3 ML IM 05/01/2021, 05/28/2021   • Hep B, adult 11/08/2004   • Td (adult), Unspecified 07/17/2003         Earline Reyes MD  11/9/2022,4:13 PM

## 2022-12-02 DIAGNOSIS — I10 HYPERTENSION, UNSPECIFIED TYPE: ICD-10-CM

## 2022-12-02 RX ORDER — AMLODIPINE BESYLATE 10 MG/1
10 TABLET ORAL DAILY
Qty: 90 TABLET | Refills: 0 | Status: SHIPPED | OUTPATIENT
Start: 2022-12-02 | End: 2023-03-02

## 2022-12-17 DIAGNOSIS — J45.30 MILD PERSISTENT ASTHMA WITHOUT COMPLICATION: ICD-10-CM

## 2022-12-17 DIAGNOSIS — E78.2 MIXED HYPERLIPIDEMIA: ICD-10-CM

## 2022-12-19 DIAGNOSIS — E78.2 MIXED HYPERLIPIDEMIA: ICD-10-CM

## 2022-12-19 RX ORDER — ATORVASTATIN CALCIUM 20 MG/1
20 TABLET, FILM COATED ORAL DAILY
Qty: 90 TABLET | Refills: 0 | Status: SHIPPED | OUTPATIENT
Start: 2022-12-19

## 2022-12-19 RX ORDER — ATORVASTATIN CALCIUM 20 MG/1
20 TABLET, FILM COATED ORAL DAILY
Qty: 90 TABLET | Refills: 0 | Status: SHIPPED | OUTPATIENT
Start: 2022-12-19 | End: 2022-12-19 | Stop reason: SDUPTHER

## 2022-12-19 RX ORDER — ALBUTEROL SULFATE 90 UG/1
2 AEROSOL, METERED RESPIRATORY (INHALATION) EVERY 6 HOURS PRN
Qty: 18 G | Refills: 2 | Status: SHIPPED | OUTPATIENT
Start: 2022-12-19

## 2022-12-22 ENCOUNTER — OFFICE VISIT (OUTPATIENT)
Dept: INTERNAL MEDICINE CLINIC | Facility: CLINIC | Age: 57
End: 2022-12-22

## 2022-12-22 VITALS
DIASTOLIC BLOOD PRESSURE: 84 MMHG | SYSTOLIC BLOOD PRESSURE: 130 MMHG | BODY MASS INDEX: 26.4 KG/M2 | WEIGHT: 168.2 LBS | RESPIRATION RATE: 18 BRPM | HEIGHT: 67 IN | HEART RATE: 101 BPM | OXYGEN SATURATION: 99 % | TEMPERATURE: 97.5 F

## 2022-12-22 DIAGNOSIS — I10 BENIGN ESSENTIAL HYPERTENSION: Primary | ICD-10-CM

## 2022-12-22 DIAGNOSIS — F51.01 PRIMARY INSOMNIA: ICD-10-CM

## 2022-12-22 NOTE — PROGRESS NOTES
INTERNAL MEDICINE FOLLOW-UP OFFICE VISIT  [facility]HAV[facility] MEDIC[facility] AT [place] [facility]    NAME: Soledad Velázquez  AGE: 61 y o  SEX: male  : 1965   MRN: [id number]    DATE: 2022  TIME: 10:32 AM    Assessment and Plan     Diagnoses and all orders for this visit:    Benign essential hypertension   was told to continue taking the metoprolol and amlodipine at the same dose as blood pressure is well controlled today  He was also told to cut down on the salt intake in his diet     Primary insomnia  He was told to continue taking the melatonin for now  If that does not help, he may need to take trazodone      - Counseling Documentation: patient was counseled regarding: instructions for management, risk factor reductions, prognosis, patient and family education, risks and benefits of treatment options and importance of compliance with treatment  - Medication Side Effects: Adverse side effects of medications were reviewed with the patient/guardian today  Return to office in:  2 months    Chief Complaint     Chief Complaint   Patient presents with   • Follow-up     4 weeks       History of Present Illness     HPI   blood pressure is better controlled now that he is taking the metoprolol and the amlodipine   He complains of not getting into a deep sleep even though he gets 8 hour sleep and is not on refreshed the following day       The following portions of the patient's history were reviewed and updated as appropriate: allergies, current medications, past family history, past medical history, past social history, past surgical history and problem list     Review of Systems     Review of Systems   Constitutional: Negative for chills, diaphoresis, fatigue and fever  HENT: Negative for congestion, ear discharge, ear pain, hearing loss, postnasal drip, rhinorrhea, sinus pressure, sinus pain, sneezing, sore throat and voice change  Eyes: Negative for pain, discharge, redness and visual disturbance  Respiratory: Negative for cough, chest tightness, shortness of breath and wheezing  Cardiovascular: Negative for chest pain, palpitations and leg swelling  Gastrointestinal: Negative for abdominal distention, abdominal pain, blood in stool, constipation, diarrhea, nausea and vomiting  Endocrine: Negative for cold intolerance, heat intolerance, polydipsia, polyphagia and polyuria  Genitourinary: Negative for dysuria, flank pain, frequency, hematuria and urgency  Musculoskeletal: Negative for arthralgias, back pain, gait problem, joint swelling, myalgias, neck pain and neck stiffness  Skin: Negative for rash  Neurological: Negative for dizziness, tremors, syncope, facial asymmetry, speech difficulty, weakness, light-headedness, numbness and headaches  Hematological: Does not bruise/bleed easily  Psychiatric/Behavioral: Positive for sleep disturbance  Negative for behavioral problems and confusion  The patient is not nervous/anxious  Active Problem List     Patient Active Problem List   Diagnosis   • Allergic rhinitis   • Benign essential hypertension   • Dermatitis   • Cervical radiculopathy   • Lumbar radiculopathy   • WBC disease   • Mild persistent asthma without complication   • Overweight   • Lateral epicondylitis of right elbow   • Hyperglycemia   • Mixed hyperlipidemia   • Acquired hypothyroidism   • Vitamin D deficiency   • Primary insomnia       Objective     /84   Pulse 101   Temp 97 5 °F (36 4 °C) (Tympanic)   Resp 18   Ht 5' 7" (1 702 m)   Wt 76 3 kg (168 lb 3 2 oz)   SpO2 99%   BMI 26 34 kg/m²     Physical Exam  Constitutional:       General: He is not in acute distress  Appearance: He is well-developed  He is not diaphoretic  HENT:      Head: Normocephalic and atraumatic  Right Ear: External ear normal       Left Ear: External ear normal       Nose: Nose normal    Eyes:      General: No scleral icterus  Right eye: No discharge           Left eye: No discharge  Conjunctiva/sclera: Conjunctivae normal    Neck:      Thyroid: No thyromegaly  Vascular: No JVD  Trachea: No tracheal deviation  Cardiovascular:      Rate and Rhythm: Normal rate and regular rhythm  Heart sounds: Normal heart sounds  No murmur heard  No friction rub  No gallop  Pulmonary:      Effort: Pulmonary effort is normal  No respiratory distress  Breath sounds: Normal breath sounds  No wheezing or rales  Chest:      Chest wall: No tenderness  Abdominal:      General: Bowel sounds are normal  There is no distension  Palpations: Abdomen is soft  Tenderness: There is no abdominal tenderness  There is no guarding or rebound  Musculoskeletal:         General: No tenderness  Normal range of motion  Cervical back: Normal range of motion and neck supple  Lymphadenopathy:      Cervical: No cervical adenopathy  Skin:     General: Skin is warm and dry  Findings: No erythema or rash  Neurological:      Mental Status: He is alert and oriented to person, place, and time  Cranial Nerves: No cranial nerve deficit  Motor: No abnormal muscle tone        Coordination: Coordination normal    Psychiatric:         Judgment: Judgment normal          Current Medications       Current Outpatient Medications:   •  albuterol (PROVENTIL HFA,VENTOLIN HFA) 90 mcg/act inhaler, Inhale 2 puffs every 6 (six) hours as needed for wheezing or shortness of breath, Disp: 18 g, Rfl: 2  •  amLODIPine (NORVASC) 10 mg tablet, Take 1 tablet (10 mg total) by mouth daily, Disp: 90 tablet, Rfl: 0  •  atorvastatin (LIPITOR) 20 mg tablet, Take 1 tablet (20 mg total) by mouth daily, Disp: 90 tablet, Rfl: 0  •  Cetirizine HCl (ZyrTEC Allergy) 10 MG CAPS, Take by mouth, Disp: , Rfl:   •  metoprolol succinate (TOPROL-XL) 25 mg 24 hr tablet, Take 1 tablet (25 mg total) by mouth daily, Disp: 90 tablet, Rfl: 1  •  pantoprazole (PROTONIX) 40 mg tablet, Take 1 tablet (40 mg total) by mouth daily before breakfast, Disp: 90 tablet, Rfl: 1    Health Maintenance     Health Maintenance   Topic Date Due   • Hepatitis C Screening  Never done   • Pneumococcal Vaccine: Pediatrics (0 to 5 Years) and At-Risk Patients (6 to 59 Years) (1 - PCV) Never done   • HIV Screening  Never done   • Annual Physical  Never done   • DTaP,Tdap,and Td Vaccines (1 - Tdap) 07/18/2003   • Hepatitis B Vaccine (2 of 3 - 3-dose series) 12/06/2004   • COVID-19 Vaccine (3 - Booster for Pfizer series) 07/23/2021   • PT PLAN OF CARE  09/22/2021   • Influenza Vaccine (1) Never done   • Depression Screening  03/29/2023   • BMI: Followup Plan  03/29/2023   • BMI: Adult  12/22/2023   • Colorectal Cancer Screening  04/19/2026   • HIB Vaccine  Aged Out   • IPV Vaccine  Aged Out   • Hepatitis A Vaccine  Aged Out   • Meningococcal ACWY Vaccine  Aged Out   • HPV Vaccine  Aged Out     Immunization History   Administered Date(s) Administered   • COVID-19 PFIZER VACCINE 0 3 ML IM 05/01/2021, 05/28/2021   • Hep B, adult 11/08/2004   • Td (adult), Unspecified 07/17/2003         Willie Kerns MD  [address] [facility] of BEHAVIORAL MEDICINE AT [facility] [place]

## 2023-03-02 DIAGNOSIS — I10 HYPERTENSION, UNSPECIFIED TYPE: ICD-10-CM

## 2023-03-02 RX ORDER — AMLODIPINE BESYLATE 10 MG/1
10 TABLET ORAL DAILY
Qty: 90 TABLET | Refills: 0 | Status: SHIPPED | OUTPATIENT
Start: 2023-03-02 | End: 2023-05-31

## 2023-03-21 DIAGNOSIS — E78.2 MIXED HYPERLIPIDEMIA: ICD-10-CM

## 2023-03-21 RX ORDER — ATORVASTATIN CALCIUM 20 MG/1
20 TABLET, FILM COATED ORAL DAILY
Qty: 90 TABLET | Refills: 0 | Status: SHIPPED | OUTPATIENT
Start: 2023-03-21

## 2023-05-12 DIAGNOSIS — I10 BENIGN ESSENTIAL HYPERTENSION: ICD-10-CM

## 2023-05-15 RX ORDER — METOPROLOL SUCCINATE 25 MG/1
25 TABLET, EXTENDED RELEASE ORAL DAILY
Qty: 90 TABLET | Refills: 1 | Status: SHIPPED | OUTPATIENT
Start: 2023-05-15

## 2023-05-31 DIAGNOSIS — I10 HYPERTENSION, UNSPECIFIED TYPE: ICD-10-CM

## 2023-05-31 RX ORDER — AMLODIPINE BESYLATE 10 MG/1
10 TABLET ORAL DAILY
Qty: 90 TABLET | Refills: 0 | Status: SHIPPED | OUTPATIENT
Start: 2023-05-31 | End: 2023-08-29

## 2023-06-27 DIAGNOSIS — E78.2 MIXED HYPERLIPIDEMIA: ICD-10-CM

## 2023-06-27 RX ORDER — ATORVASTATIN CALCIUM 20 MG/1
20 TABLET, FILM COATED ORAL DAILY
Qty: 90 TABLET | Refills: 0 | Status: SHIPPED | OUTPATIENT
Start: 2023-06-27

## 2023-08-31 DIAGNOSIS — I10 HYPERTENSION, UNSPECIFIED TYPE: ICD-10-CM

## 2023-08-31 RX ORDER — AMLODIPINE BESYLATE 10 MG/1
10 TABLET ORAL DAILY
Qty: 90 TABLET | Refills: 0 | Status: SHIPPED | OUTPATIENT
Start: 2023-08-31 | End: 2023-11-29

## 2023-09-27 DIAGNOSIS — E78.2 MIXED HYPERLIPIDEMIA: ICD-10-CM

## 2023-09-27 RX ORDER — ATORVASTATIN CALCIUM 20 MG/1
20 TABLET, FILM COATED ORAL DAILY
Qty: 90 TABLET | Refills: 0 | Status: SHIPPED | OUTPATIENT
Start: 2023-09-27

## 2023-11-13 DIAGNOSIS — I10 BENIGN ESSENTIAL HYPERTENSION: ICD-10-CM

## 2023-11-13 RX ORDER — METOPROLOL SUCCINATE 25 MG/1
25 TABLET, EXTENDED RELEASE ORAL DAILY
Qty: 90 TABLET | Refills: 0 | Status: SHIPPED | OUTPATIENT
Start: 2023-11-13

## 2023-12-23 DIAGNOSIS — E78.2 MIXED HYPERLIPIDEMIA: ICD-10-CM

## 2023-12-25 DIAGNOSIS — I10 HYPERTENSION, UNSPECIFIED TYPE: ICD-10-CM

## 2023-12-26 RX ORDER — ATORVASTATIN CALCIUM 20 MG/1
20 TABLET, FILM COATED ORAL DAILY
Qty: 90 TABLET | Refills: 0 | Status: SHIPPED | OUTPATIENT
Start: 2023-12-26

## 2023-12-26 RX ORDER — AMLODIPINE BESYLATE 10 MG/1
10 TABLET ORAL DAILY
Qty: 90 TABLET | Refills: 0 | Status: SHIPPED | OUTPATIENT
Start: 2023-12-26 | End: 2024-03-25

## 2024-05-06 ENCOUNTER — OFFICE VISIT (OUTPATIENT)
Age: 59
End: 2024-05-06
Payer: COMMERCIAL

## 2024-05-06 VITALS
SYSTOLIC BLOOD PRESSURE: 126 MMHG | HEIGHT: 67 IN | TEMPERATURE: 94.3 F | DIASTOLIC BLOOD PRESSURE: 76 MMHG | WEIGHT: 168.6 LBS | HEART RATE: 112 BPM | OXYGEN SATURATION: 98 % | BODY MASS INDEX: 26.46 KG/M2

## 2024-05-06 DIAGNOSIS — R73.01 IMPAIRED FASTING GLUCOSE: ICD-10-CM

## 2024-05-06 DIAGNOSIS — M54.12 CERVICAL RADICULOPATHY: ICD-10-CM

## 2024-05-06 DIAGNOSIS — E03.9 ACQUIRED HYPOTHYROIDISM: ICD-10-CM

## 2024-05-06 DIAGNOSIS — Z12.5 ENCOUNTER FOR SCREENING FOR MALIGNANT NEOPLASM OF PROSTATE: ICD-10-CM

## 2024-05-06 DIAGNOSIS — E78.2 MIXED HYPERLIPIDEMIA: ICD-10-CM

## 2024-05-06 DIAGNOSIS — Z13.220 ENCOUNTER FOR LIPID SCREENING FOR CARDIOVASCULAR DISEASE: ICD-10-CM

## 2024-05-06 DIAGNOSIS — I10 BENIGN ESSENTIAL HYPERTENSION: Primary | ICD-10-CM

## 2024-05-06 DIAGNOSIS — Z13.6 ENCOUNTER FOR LIPID SCREENING FOR CARDIOVASCULAR DISEASE: ICD-10-CM

## 2024-05-06 DIAGNOSIS — Z11.4 ENCOUNTER FOR SCREENING FOR HIV: ICD-10-CM

## 2024-05-06 DIAGNOSIS — Z11.59 ENCOUNTER FOR HEPATITIS C SCREENING TEST FOR LOW RISK PATIENT: ICD-10-CM

## 2024-05-06 DIAGNOSIS — J45.30 MILD PERSISTENT ASTHMA WITHOUT COMPLICATION: ICD-10-CM

## 2024-05-06 DIAGNOSIS — R53.83 OTHER FATIGUE: ICD-10-CM

## 2024-05-06 DIAGNOSIS — I10 HYPERTENSION, UNSPECIFIED TYPE: ICD-10-CM

## 2024-05-06 DIAGNOSIS — E78.5 HYPERLIPIDEMIA, UNSPECIFIED HYPERLIPIDEMIA TYPE: ICD-10-CM

## 2024-05-06 PROCEDURE — 99396 PREV VISIT EST AGE 40-64: CPT

## 2024-05-06 RX ORDER — AMLODIPINE BESYLATE 10 MG/1
10 TABLET ORAL DAILY
Qty: 90 TABLET | Refills: 3 | Status: SHIPPED | OUTPATIENT
Start: 2024-05-06 | End: 2025-05-01

## 2024-05-06 RX ORDER — FLUTICASONE PROPIONATE 50 MCG
1 SPRAY, SUSPENSION (ML) NASAL DAILY
COMMUNITY

## 2024-05-06 NOTE — PROGRESS NOTES
ADULT ANNUAL PHYSICAL  [facility] CARE [place]    NAME: Juan M Jorgensen  AGE: 58 y.o. SEX: male  : 1965     DATE: 2024     Assessment and Plan:     Problem List Items Addressed This Visit       Benign essential hypertension - Primary     COntrolled today at 126/76.... Will continue almodipine. hasnt been taking metoprolol for bp, will d/c.          Relevant Medications    amLODIPine (NORVASC) 10 mg tablet    Cervical radiculopathy     hx bulging around c7. Declined surgical intervention. Was going to chiropractor. Has been getting small spasms and weakness. Trying home exercises for now.          Mild persistent asthma without complication     Controlled, uses inhaler couple times in winter.          Mixed hyperlipidemia     Was on statin.. Hasn't been taking bc lost insurace, Will check labs and likely restart          Acquired hypothyroidism      never was on anything. will check labs          Relevant Orders    TSH, 3rd generation with Free T4 reflex     Other Visit Diagnoses       Hyperlipidemia, unspecified hyperlipidemia type        Relevant Orders    Lipid panel    Impaired fasting glucose        Relevant Orders    Hemoglobin A1C    Other fatigue        Relevant Orders    Comprehensive metabolic panel    CBC and differential    Encounter for screening for HIV        Relevant Orders    HIV 1/2 AG/AB w Reflex [facility] for 2 yr old and above    Encounter for hepatitis C screening test for low risk patient        Relevant Orders    Hepatitis C antibody    Encounter for lipid screening for cardiovascular disease        Relevant Orders    Lipid panel    Encounter for screening for malignant neoplasm of prostate        Relevant Orders    PSA, Total Screen    Hypertension, unspecified type        Relevant Medications    amLODIPine (NORVASC) 10 mg tablet            Immunizations and preventive care screenings were discussed with patient today. Appropriate  education was printed on patient's after visit summary.    Discussed risks and benefits of prostate cancer screening. We discussed the controversial history of PSA screening for prostate cancer in the [place] as well as the risk of over detection and over treatment of prostate cancer by way of PSA screening.  The patient understands that PSA blood testing is an imperfect way to screen for prostate cancer and that elevated PSA levels in the blood may also be caused by infection, inflammation, prostatic trauma or manipulation, urological procedures, or by benign prostatic enlargement.    The role of the digital rectal examination in prostate cancer screening was also discussed and I discussed with him that there is large interobserver variability in the findings of digital rectal examination.    Counseling:  Alcohol/drug use: discussed moderation in alcohol intake, the recommendations for healthy alcohol use, and avoidance of illicit drug use.  Dental Health: discussed importance of regular tooth brushing, flossing, and dental visits.  Injury prevention: discussed safety/seat belts, safety helmets, smoke detectors, carbon dioxide detectors, and smoking near bedding or upholstery.  Sexual health: discussed sexually transmitted diseases, partner selection, use of condoms, avoidance of unintended pregnancy, and contraceptive alternatives.  Exercise: the importance of regular exercise/physical activity was discussed. Recommend exercise 3-5 times per week for at least 30 minutes.       Depression Screening and Follow-up Plan: Patient was screened for depression during today's encounter. They screened negative with a PHQ-2 score of 0.        Return in about 6 months (around 11/6/2024) for Recheck.     Chief Complaint:     Chief Complaint   Patient presents with    Establish Care     Med refills if still needs to be on blood pressure meds.      History of Present Illness:     Adult Annual Physical   Patient here for a  comprehensive physical exam. The patient reports no problems.    Diet and Physical Activity  Diet/Nutrition: well balanced diet and portion control.   Exercise: moderate cardiovascular exercise and strength training exercises.      Depression Screening  PHQ-2/9 Depression Screening    Little interest or pleasure in doing things: 0 - not at all  Feeling down, depressed, or hopeless: 0 - not at all  PHQ-2 Score: 0  PHQ-2 Interpretation: Negative depression screen       General Health  Sleep: sleeps well.   Hearing: normal - none .  Vision: wears glasses.   Dental: regular dental visits.        Health  Symptoms include: none    Advanced Care Planning  Do you have an advanced directive? no  Do you have a durable medical power of ? no  ACP document given to patient? no     Review of Systems:     Review of Systems   Constitutional:  Positive for fatigue. Negative for activity change, appetite change, fever and unexpected weight change.   HENT: Negative.     Eyes: Negative.    Respiratory:  Negative for cough, chest tightness and shortness of breath.    Cardiovascular:  Negative for chest pain, palpitations and leg swelling.   Gastrointestinal: Negative.    Endocrine: Negative.    Genitourinary: Negative.    Musculoskeletal: Negative.    Skin: Negative.    Neurological: Negative.    Hematological: Negative.    Psychiatric/Behavioral: Negative.        Past Medical History:     Past Medical History:   Diagnosis Date    Allergic conjunctivitis     Resolved 12/18/2017     Asthma     Disease of thyroid gland     Hypertension       Past Surgical History:     Past Surgical History:   Procedure Laterality Date    COLONOSCOPY  2021    FOOT ARTHRODESIS        Family History:     Family History   Problem Relation Age of Onset    Hyperlipidemia Mother     Hypertension Mother       Social History:     Social History     Socioeconomic History    Marital status: Single     Spouse name: None    Number of children: None    Years  of education: None    Highest education level: None   Occupational History    None   Tobacco Use    Smoking status: Never    Smokeless tobacco: Never   Vaping Use    Vaping status: Never Used   Substance and Sexual Activity    Alcohol use: Yes     Alcohol/week: 2.0 standard drinks of alcohol     Types: 2 Standard drinks or equivalent per week     Comment: Social     Drug use: Yes     Types: Marijuana     Comment: socially    Sexual activity: Yes     Partners: Female   Other Topics Concern    None   Social History Narrative    None     Social Determinants of Health     Financial Resource Strain: Not on file   Food Insecurity: Not on file   Transportation Needs: Not on file   Physical Activity: Inactive (1/11/2021)    Exercise Vital Sign     Days of Exercise per Week: 0 days     Minutes of Exercise per Session: 0 min   Stress: No Stress Concern Present (1/11/2021)    [employer] [place] [employer] - Occupational Stress Questionnaire     Feeling of Stress : Not at all   Social Connections: Not on file   Intimate Partner Violence: Not on file   Housing Stability: Not on file      Current Medications:     Current Outpatient Medications   Medication Sig Dispense Refill    albuterol (PROVENTIL HFA,VENTOLIN HFA) 90 mcg/act inhaler Inhale 2 puffs every 6 (six) hours as needed for wheezing or shortness of breath 18 g 2    amLODIPine (NORVASC) 10 mg tablet Take 1 tablet (10 mg total) by mouth daily 90 tablet 3    Cetirizine HCl (ZyrTEC Allergy) 10 MG CAPS Take by mouth      fluticasone (FLONASE) 50 mcg/act nasal spray 1 spray into each nostril daily      atorvastatin (LIPITOR) 20 mg tablet Take 1 tablet (20 mg total) by mouth daily (Patient not taking: Reported on 5/6/2024) 90 tablet 0    pantoprazole (PROTONIX) 40 mg tablet Take 1 tablet (40 mg total) by mouth daily before breakfast (Patient not taking: Reported on 5/6/2024) 90 tablet 1     No current facility-administered medications for this visit.      Allergies:  "    Allergies   Allergen Reactions    Cat Hair Extract     Dog Epithelium     Dust Mite Extract     Molds & Smuts     Other     Pollen Extract     Dario Grass Pollen Allergen       Physical Exam:     /76   Pulse (!) 112   Temp (!) 94.3 °F (34.6 °C)   Ht 5' 7\" (1.702 m)   Wt 76.5 kg (168 lb 9.6 oz)   SpO2 98%   BMI 26.41 kg/m²     Physical Exam  Vitals and nursing note reviewed.   Constitutional:       General: He is not in acute distress.     Appearance: Normal appearance. He is normal weight. He is not ill-appearing, toxic-appearing or diaphoretic.   HENT:      Head: Normocephalic and atraumatic.      Right Ear: Tympanic membrane, ear canal and external ear normal. There is no impacted cerumen.      Left Ear: Tympanic membrane, ear canal and external ear normal. There is no impacted cerumen.      Nose: Nose normal. No congestion or rhinorrhea.      Mouth/Throat:      Mouth: Mucous membranes are moist.      Pharynx: Oropharynx is clear. No oropharyngeal exudate.   Eyes:      General: No scleral icterus.        Right eye: No discharge.         Left eye: No discharge.      Extraocular Movements: Extraocular movements intact.      Conjunctiva/sclera: Conjunctivae normal.   Neck:      Vascular: No carotid bruit.   Cardiovascular:      Rate and Rhythm: Normal rate and regular rhythm.      Heart sounds: No murmur heard.  Pulmonary:      Effort: Pulmonary effort is normal.      Breath sounds: Normal breath sounds.   Abdominal:      General: Abdomen is flat. Bowel sounds are normal.      Palpations: There is no mass.      Tenderness: There is no abdominal tenderness. There is no rebound.   Musculoskeletal:      Cervical back: Normal range of motion and neck supple. No tenderness.   Lymphadenopathy:      Cervical: No cervical adenopathy.   Skin:     General: Skin is warm and dry.      Findings: No rash.   Neurological:      Mental Status: He is alert. Mental status is at baseline.      Sensory: No sensory " deficit.      Motor: No weakness.   Psychiatric:         Mood and Affect: Mood normal.         Behavior: Behavior normal.         Thought Content: Thought content normal.         Judgment: Judgment normal.          Brooks Oropeza PA-C  [facility] PRIMARY CARE [place]

## 2024-05-06 NOTE — ASSESSMENT & PLAN NOTE
hx bulging around c7. Declined surgical intervention. Was going to chiropractor. Has been getting small spasms and weakness. Trying home exercises for now.

## 2024-05-06 NOTE — ASSESSMENT & PLAN NOTE
COntrolled today at 126/76.... Will continue almodipine. hasnt been taking metoprolol for bp, will d/c.

## 2024-07-10 ENCOUNTER — APPOINTMENT (OUTPATIENT)
Age: 59
End: 2024-07-10
Payer: COMMERCIAL

## 2024-07-10 DIAGNOSIS — Z13.220 ENCOUNTER FOR LIPID SCREENING FOR CARDIOVASCULAR DISEASE: ICD-10-CM

## 2024-07-10 DIAGNOSIS — Z11.59 ENCOUNTER FOR HEPATITIS C SCREENING TEST FOR LOW RISK PATIENT: ICD-10-CM

## 2024-07-10 DIAGNOSIS — E03.9 ACQUIRED HYPOTHYROIDISM: ICD-10-CM

## 2024-07-10 DIAGNOSIS — Z11.4 ENCOUNTER FOR SCREENING FOR HIV: ICD-10-CM

## 2024-07-10 DIAGNOSIS — R73.01 IMPAIRED FASTING GLUCOSE: ICD-10-CM

## 2024-07-10 DIAGNOSIS — Z12.5 ENCOUNTER FOR SCREENING FOR MALIGNANT NEOPLASM OF PROSTATE: ICD-10-CM

## 2024-07-10 DIAGNOSIS — R53.83 OTHER FATIGUE: ICD-10-CM

## 2024-07-10 DIAGNOSIS — E78.5 HYPERLIPIDEMIA, UNSPECIFIED HYPERLIPIDEMIA TYPE: ICD-10-CM

## 2024-07-10 DIAGNOSIS — Z13.6 ENCOUNTER FOR LIPID SCREENING FOR CARDIOVASCULAR DISEASE: ICD-10-CM

## 2024-07-10 LAB
BASOPHILS # BLD AUTO: 0.06 THOUSANDS/ÂΜL (ref 0–0.1)
BASOPHILS NFR BLD AUTO: 2 % (ref 0–1)
EOSINOPHIL # BLD AUTO: 0.2 THOUSAND/ÂΜL (ref 0–0.61)
EOSINOPHIL NFR BLD AUTO: 5 % (ref 0–6)
ERYTHROCYTE [DISTWIDTH] IN BLOOD BY AUTOMATED COUNT: 12.9 % (ref 11.6–15.1)
EST. AVERAGE GLUCOSE BLD GHB EST-MCNC: 100 MG/DL
HBA1C MFR BLD: 5.1 %
HCT VFR BLD AUTO: 42.2 % (ref 36.5–49.3)
HGB BLD-MCNC: 15.4 G/DL (ref 12–17)
HIV 1+2 AB+HIV1 P24 AG SERPL QL IA: NORMAL
HIV 2 AB SERPL QL IA: NORMAL
HIV1 AB SERPL QL IA: NORMAL
HIV1 P24 AG SERPL QL IA: NORMAL
IMM GRANULOCYTES # BLD AUTO: 0.01 THOUSAND/UL (ref 0–0.2)
IMM GRANULOCYTES NFR BLD AUTO: 0 % (ref 0–2)
LYMPHOCYTES # BLD AUTO: 0.41 THOUSANDS/ÂΜL (ref 0.6–4.47)
LYMPHOCYTES NFR BLD AUTO: 10 % (ref 14–44)
MCH RBC QN AUTO: 34.6 PG (ref 26.8–34.3)
MCHC RBC AUTO-ENTMCNC: 36.5 G/DL (ref 31.4–37.4)
MCV RBC AUTO: 95 FL (ref 82–98)
MONOCYTES # BLD AUTO: 0.51 THOUSAND/ÂΜL (ref 0.17–1.22)
MONOCYTES NFR BLD AUTO: 13 % (ref 4–12)
NEUTROPHILS # BLD AUTO: 2.74 THOUSANDS/ÂΜL (ref 1.85–7.62)
NEUTS SEG NFR BLD AUTO: 70 % (ref 43–75)
NRBC BLD AUTO-RTO: 0 /100 WBCS
PLATELET # BLD AUTO: 172 THOUSANDS/UL (ref 149–390)
PMV BLD AUTO: 9.5 FL (ref 8.9–12.7)
PSA SERPL-MCNC: 1.9 NG/ML (ref 0–4)
RBC # BLD AUTO: 4.45 MILLION/UL (ref 3.88–5.62)
T4 FREE SERPL-MCNC: 0.8 NG/DL (ref 0.61–1.12)
TSH SERPL DL<=0.05 MIU/L-ACNC: 4.9 UIU/ML (ref 0.45–4.5)
WBC # BLD AUTO: 3.93 THOUSAND/UL (ref 4.31–10.16)

## 2024-07-10 PROCEDURE — 84443 ASSAY THYROID STIM HORMONE: CPT

## 2024-07-10 PROCEDURE — 86803 HEPATITIS C AB TEST: CPT

## 2024-07-10 PROCEDURE — 84439 ASSAY OF FREE THYROXINE: CPT

## 2024-07-10 PROCEDURE — 80061 LIPID PANEL: CPT

## 2024-07-10 PROCEDURE — 36415 COLL VENOUS BLD VENIPUNCTURE: CPT

## 2024-07-10 PROCEDURE — 85025 COMPLETE CBC W/AUTO DIFF WBC: CPT

## 2024-07-10 PROCEDURE — 87389 HIV-1 AG W/HIV-1&-2 AB AG IA: CPT

## 2024-07-10 PROCEDURE — 83036 HEMOGLOBIN GLYCOSYLATED A1C: CPT

## 2024-07-10 PROCEDURE — 80053 COMPREHEN METABOLIC PANEL: CPT

## 2024-07-10 PROCEDURE — G0103 PSA SCREENING: HCPCS

## 2024-07-11 LAB
ALBUMIN SERPL BCG-MCNC: 4.8 G/DL (ref 3.5–5)
ALP SERPL-CCNC: 78 U/L (ref 34–104)
ALT SERPL W P-5'-P-CCNC: 43 U/L (ref 7–52)
ANION GAP SERPL CALCULATED.3IONS-SCNC: 10 MMOL/L (ref 4–13)
AST SERPL W P-5'-P-CCNC: 38 U/L (ref 13–39)
BILIRUB SERPL-MCNC: 1.14 MG/DL (ref 0.2–1)
BUN SERPL-MCNC: 12 MG/DL (ref 5–25)
CALCIUM SERPL-MCNC: 9.3 MG/DL (ref 8.4–10.2)
CHLORIDE SERPL-SCNC: 101 MMOL/L (ref 96–108)
CHOLEST SERPL-MCNC: 272 MG/DL
CO2 SERPL-SCNC: 27 MMOL/L (ref 21–32)
CREAT SERPL-MCNC: 1.1 MG/DL (ref 0.6–1.3)
GFR SERPL CREATININE-BSD FRML MDRD: 72 ML/MIN/1.73SQ M
GLUCOSE P FAST SERPL-MCNC: 104 MG/DL (ref 65–99)
HCV AB SER QL: NORMAL
HDLC SERPL-MCNC: 73 MG/DL
LDLC SERPL CALC-MCNC: 164 MG/DL (ref 0–100)
NONHDLC SERPL-MCNC: 199 MG/DL
POTASSIUM SERPL-SCNC: 3.8 MMOL/L (ref 3.5–5.3)
PROT SERPL-MCNC: 8 G/DL (ref 6.4–8.4)
SODIUM SERPL-SCNC: 138 MMOL/L (ref 135–147)
TRIGL SERPL-MCNC: 176 MG/DL

## 2024-07-16 ENCOUNTER — OFFICE VISIT (OUTPATIENT)
Age: 59
End: 2024-07-16
Payer: COMMERCIAL

## 2024-07-16 VITALS
SYSTOLIC BLOOD PRESSURE: 110 MMHG | TEMPERATURE: 94.7 F | HEIGHT: 67 IN | WEIGHT: 165.4 LBS | OXYGEN SATURATION: 98 % | DIASTOLIC BLOOD PRESSURE: 72 MMHG | HEART RATE: 82 BPM | BODY MASS INDEX: 25.96 KG/M2

## 2024-07-16 DIAGNOSIS — Z12.5 ENCOUNTER FOR SCREENING FOR MALIGNANT NEOPLASM OF PROSTATE: ICD-10-CM

## 2024-07-16 DIAGNOSIS — E78.2 MIXED HYPERLIPIDEMIA: ICD-10-CM

## 2024-07-16 DIAGNOSIS — J30.1 SEASONAL ALLERGIC RHINITIS DUE TO POLLEN: ICD-10-CM

## 2024-07-16 DIAGNOSIS — I10 BENIGN ESSENTIAL HYPERTENSION: ICD-10-CM

## 2024-07-16 DIAGNOSIS — E03.9 ACQUIRED HYPOTHYROIDISM: Primary | ICD-10-CM

## 2024-07-16 PROCEDURE — 99214 OFFICE O/P EST MOD 30 MIN: CPT

## 2024-07-16 RX ORDER — CETIRIZINE HYDROCHLORIDE 10 MG/1
10 TABLET ORAL DAILY
Qty: 30 TABLET | Refills: 4 | Status: SHIPPED | OUTPATIENT
Start: 2024-07-16 | End: 2024-12-13

## 2024-07-16 RX ORDER — ATORVASTATIN CALCIUM 20 MG/1
20 TABLET, FILM COATED ORAL DAILY
Qty: 90 TABLET | Refills: 3 | Status: SHIPPED | OUTPATIENT
Start: 2024-07-16 | End: 2025-07-11

## 2024-07-16 RX ORDER — FLUTICASONE PROPIONATE 50 MCG
1 SPRAY, SUSPENSION (ML) NASAL DAILY
Qty: 9.9 ML | Refills: 4 | Status: SHIPPED | OUTPATIENT
Start: 2024-07-16

## 2024-07-16 NOTE — PROGRESS NOTES
Ambulatory Visit  Name: Juan M Jorgensen      : 1965      MRN: [id number]  Encounter Provider: Brooks Oropeza PA-C  Encounter Date: 2024   Encounter department: [facility] PRIMARY CARE [place]    Assessment & Plan   1. Acquired hypothyroidism  Comments:  TSH slightly elevate, stable without meds will continue to monitor  2. Benign essential hypertension  Comments:  Controlled today at 110/72 continue current meds and lifestyle changes  Orders:  -     Comprehensive metabolic panel; Future; Expected date: 2025  -     CBC and differential; Future; Expected date: 2025  -     TSH, 3rd generation with Free T4 reflex; Future; Expected date: 2025  3. Mixed hyperlipidemia  Comments:  Uncontrolled. will restart lipitor and watch diet  Orders:  -     atorvastatin (LIPITOR) 20 mg tablet; Take 1 tablet (20 mg total) by mouth daily  -     Lipid panel; Future; Expected date: 2025  4. Seasonal allergic rhinitis due to pollen  -     cetirizine (ZyrTEC) 10 mg tablet; Take 1 tablet (10 mg total) by mouth daily  -     fluticasone (FLONASE) 50 mcg/act nasal spray; 1 spray into each nostril daily  5. Encounter for screening for malignant neoplasm of prostate  -     PSA, Total Screen; Future; Expected date: 2025       History of Present Illness     HPI    Review of Systems   Constitutional:  Negative for activity change, diaphoresis, fatigue and fever.   HENT:  Negative for congestion and ear pain.    Respiratory:  Negative for cough, chest tightness and shortness of breath.    Cardiovascular: Negative.    Gastrointestinal: Negative.    Endocrine: Negative for polydipsia, polyphagia and polyuria.   Genitourinary:  Negative for dysuria, flank pain, frequency, hematuria and urgency.   Musculoskeletal:  Negative for back pain, joint swelling, neck pain and neck stiffness.   Skin: Negative.    Allergic/Immunologic: Positive for environmental allergies.   Neurological:  Negative for  "dizziness, weakness, light-headedness and headaches.   Hematological: Negative.  Negative for adenopathy.   Psychiatric/Behavioral: Negative.  Negative for confusion and sleep disturbance. The patient is not nervous/anxious.        Objective     /72   Pulse 82   Temp (!) 94.7 °F (34.8 °C)   Ht 5' 7\" (1.702 m)   Wt 75 kg (165 lb 6.4 oz)   SpO2 98%   BMI 25.91 kg/m²     Physical Exam  Vitals and nursing note reviewed.   Constitutional:       General: He is not in acute distress.     Appearance: He is normal weight. He is not ill-appearing, toxic-appearing or diaphoretic.   HENT:      Head: Normocephalic and atraumatic.      Right Ear: External ear normal.      Left Ear: External ear normal.      Nose: Nose normal.   Eyes:      General: No scleral icterus.        Right eye: No discharge.         Left eye: No discharge.      Extraocular Movements: Extraocular movements intact.      Conjunctiva/sclera: Conjunctivae normal.   Neck:      Vascular: No carotid bruit.   Cardiovascular:      Rate and Rhythm: Normal rate and regular rhythm.      Heart sounds: No murmur heard.  Pulmonary:      Effort: Pulmonary effort is normal.      Breath sounds: Normal breath sounds.   Musculoskeletal:      Cervical back: Normal range of motion and neck supple.      Right lower leg: No edema.      Left lower leg: No edema.   Skin:     Findings: No rash.   Neurological:      Mental Status: He is alert. Mental status is at baseline.   Psychiatric:         Mood and Affect: Mood normal.         Behavior: Behavior normal.         Thought Content: Thought content normal.         Judgment: Judgment normal.       Administrative Statements           "

## 2025-02-06 DIAGNOSIS — J30.1 SEASONAL ALLERGIC RHINITIS DUE TO POLLEN: ICD-10-CM

## 2025-02-07 RX ORDER — CETIRIZINE HYDROCHLORIDE 10 MG/1
10 TABLET ORAL DAILY
Qty: 30 TABLET | Refills: 5 | Status: SHIPPED | OUTPATIENT
Start: 2025-02-07 | End: 2025-07-07

## 2025-05-15 ENCOUNTER — APPOINTMENT (OUTPATIENT)
Age: 60
End: 2025-05-15
Payer: COMMERCIAL

## 2025-05-15 DIAGNOSIS — Z12.5 ENCOUNTER FOR SCREENING FOR MALIGNANT NEOPLASM OF PROSTATE: ICD-10-CM

## 2025-05-15 DIAGNOSIS — I10 BENIGN ESSENTIAL HYPERTENSION: ICD-10-CM

## 2025-05-15 DIAGNOSIS — E78.2 MIXED HYPERLIPIDEMIA: ICD-10-CM

## 2025-05-15 LAB
ALBUMIN SERPL BCG-MCNC: 4.9 G/DL (ref 3.5–5)
ALP SERPL-CCNC: 99 U/L (ref 34–104)
ALT SERPL W P-5'-P-CCNC: 44 U/L (ref 7–52)
ANION GAP SERPL CALCULATED.3IONS-SCNC: 8 MMOL/L (ref 4–13)
AST SERPL W P-5'-P-CCNC: 38 U/L (ref 13–39)
BASOPHILS # BLD AUTO: 0.05 THOUSANDS/ÂΜL (ref 0–0.1)
BASOPHILS NFR BLD AUTO: 1 % (ref 0–1)
BILIRUB SERPL-MCNC: 1.23 MG/DL (ref 0.2–1)
BUN SERPL-MCNC: 11 MG/DL (ref 5–25)
CALCIUM SERPL-MCNC: 9.6 MG/DL (ref 8.4–10.2)
CHLORIDE SERPL-SCNC: 101 MMOL/L (ref 96–108)
CHOLEST SERPL-MCNC: 196 MG/DL (ref ?–200)
CO2 SERPL-SCNC: 28 MMOL/L (ref 21–32)
CREAT SERPL-MCNC: 1.1 MG/DL (ref 0.6–1.3)
EOSINOPHIL # BLD AUTO: 0.22 THOUSAND/ÂΜL (ref 0–0.61)
EOSINOPHIL NFR BLD AUTO: 6 % (ref 0–6)
ERYTHROCYTE [DISTWIDTH] IN BLOOD BY AUTOMATED COUNT: 13 % (ref 11.6–15.1)
GFR SERPL CREATININE-BSD FRML MDRD: 72 ML/MIN/1.73SQ M
GLUCOSE P FAST SERPL-MCNC: 106 MG/DL (ref 65–99)
HCT VFR BLD AUTO: 42.7 % (ref 36.5–49.3)
HDLC SERPL-MCNC: 90 MG/DL
HGB BLD-MCNC: 15 G/DL (ref 12–17)
IMM GRANULOCYTES # BLD AUTO: 0.02 THOUSAND/UL (ref 0–0.2)
IMM GRANULOCYTES NFR BLD AUTO: 1 % (ref 0–2)
LDLC SERPL CALC-MCNC: 75 MG/DL (ref 0–100)
LYMPHOCYTES # BLD AUTO: 0.44 THOUSANDS/ÂΜL (ref 0.6–4.47)
LYMPHOCYTES NFR BLD AUTO: 12 % (ref 14–44)
MCH RBC QN AUTO: 33.9 PG (ref 26.8–34.3)
MCHC RBC AUTO-ENTMCNC: 35.1 G/DL (ref 31.4–37.4)
MCV RBC AUTO: 96 FL (ref 82–98)
MONOCYTES # BLD AUTO: 0.58 THOUSAND/ÂΜL (ref 0.17–1.22)
MONOCYTES NFR BLD AUTO: 15 % (ref 4–12)
NEUTROPHILS # BLD AUTO: 2.49 THOUSANDS/ÂΜL (ref 1.85–7.62)
NEUTS SEG NFR BLD AUTO: 65 % (ref 43–75)
NONHDLC SERPL-MCNC: 106 MG/DL
NRBC BLD AUTO-RTO: 0 /100 WBCS
PLATELET # BLD AUTO: 181 THOUSANDS/UL (ref 149–390)
PMV BLD AUTO: 9.4 FL (ref 8.9–12.7)
POTASSIUM SERPL-SCNC: 4.1 MMOL/L (ref 3.5–5.3)
PROT SERPL-MCNC: 7.9 G/DL (ref 6.4–8.4)
PSA SERPL-MCNC: 2.23 NG/ML (ref 0–4)
RBC # BLD AUTO: 4.43 MILLION/UL (ref 3.88–5.62)
SODIUM SERPL-SCNC: 137 MMOL/L (ref 135–147)
T4 FREE SERPL-MCNC: 0.93 NG/DL (ref 0.61–1.12)
TRIGL SERPL-MCNC: 154 MG/DL (ref ?–150)
TSH SERPL DL<=0.05 MIU/L-ACNC: 4.91 UIU/ML (ref 0.45–4.5)
WBC # BLD AUTO: 3.8 THOUSAND/UL (ref 4.31–10.16)

## 2025-05-15 PROCEDURE — 85025 COMPLETE CBC W/AUTO DIFF WBC: CPT

## 2025-05-15 PROCEDURE — 80061 LIPID PANEL: CPT

## 2025-05-15 PROCEDURE — 84443 ASSAY THYROID STIM HORMONE: CPT

## 2025-05-15 PROCEDURE — 36415 COLL VENOUS BLD VENIPUNCTURE: CPT

## 2025-05-15 PROCEDURE — 84439 ASSAY OF FREE THYROXINE: CPT

## 2025-05-15 PROCEDURE — 80053 COMPREHEN METABOLIC PANEL: CPT

## 2025-05-15 PROCEDURE — G0103 PSA SCREENING: HCPCS

## 2025-05-16 ENCOUNTER — RESULTS FOLLOW-UP (OUTPATIENT)
Age: 60
End: 2025-05-16

## 2025-05-20 ENCOUNTER — OFFICE VISIT (OUTPATIENT)
Age: 60
End: 2025-05-20
Payer: COMMERCIAL

## 2025-05-20 VITALS
BODY MASS INDEX: 26.53 KG/M2 | HEIGHT: 67 IN | DIASTOLIC BLOOD PRESSURE: 86 MMHG | RESPIRATION RATE: 15 BRPM | HEART RATE: 90 BPM | SYSTOLIC BLOOD PRESSURE: 138 MMHG | TEMPERATURE: 98 F | WEIGHT: 169 LBS | OXYGEN SATURATION: 98 %

## 2025-05-20 DIAGNOSIS — I10 BENIGN ESSENTIAL HYPERTENSION: ICD-10-CM

## 2025-05-20 DIAGNOSIS — Z00.00 HEALTHCARE MAINTENANCE: Primary | ICD-10-CM

## 2025-05-20 DIAGNOSIS — E78.2 MIXED HYPERLIPIDEMIA: ICD-10-CM

## 2025-05-20 DIAGNOSIS — R10.11 RUQ PAIN: ICD-10-CM

## 2025-05-20 PROCEDURE — 99396 PREV VISIT EST AGE 40-64: CPT

## 2025-05-20 NOTE — PROGRESS NOTES
Adult Annual Physical  Name: Juan M Jorgensen      : 1965      MRN: [id number]  Encounter Provider: Brooks Oropeza PA-C  Encounter Date: 2025   Encounter department: [facility]    :  Assessment & Plan  Healthcare maintenance  Health maintenance reviewed        Mixed hyperlipidemia  Cholesterol numbers significantly improved.  Continue Lipitor 20 mg daily       Benign essential hypertension  Controlled at 138/86.  Continue amlodipine 10 mg daily with lifestyle modifications.  Discussed is borderline and should be monitored.  Stressed lifestyle modifications and follow-up in 6 months to recheck       RUQ pain  Has been going on intermittently for the past several years.  Has been slightly worse over the past 2 weeks.  Negative Ferrer's on exam.  No jaundice or icterus.  May be worse with fluids but not completely sure.  States he has had gallbladder issues in the past and suggested removal but symptoms resolved with lifestyle modifications.  Obtain ultrasound  Orders:    US abdomen complete; Future        Preventive Screenings:  - Diabetes Screening: screening up-to-date  - Cholesterol Screening: screening not indicated and has hyperlipidemia   - Hepatitis C screening: screening up-to-date   - HIV screening: screening up-to-date   - Colon cancer screening: screening up-to-date   - Lung cancer screening: screening not indicated   - Prostate cancer screening: screening up-to-date     Immunizations:  - Immunizations due: Prevnar 20, Tdap and Zoster (Shingrix)      Depression Screening and Follow-up Plan: Patient was screened for depression during today's encounter. They screened negative with a PHQ-2 score of 0.      Tobacco Cessation Counseling: Tobacco cessation counseling was provided. The patient is sincerely urged to quit consumption of tobacco. He is ready to quit tobacco.         History of Present Illness     Adult Annual Physical:  Patient presents for annual  physical.     Diet and Physical Activity:  - Diet/Nutrition: no special diet.  - Exercise: no formal exercise and less than 30 minutes on average.    General Health:  - Sleep: sleeps well.  - Hearing: normal hearing right ear and normal hearing left ear.  - Vision: wears glasses.  - Dental: no dental visits for > 1 year and brushes teeth once daily.    /GYN Health:  - Follows with GYN: no.   - History of STDs: no     Health:  - History of STDs: no.   - Urinary symptoms: none.     Advanced Care Planning:  - Has an advanced directive?: no    - Has a durable medical POA?: no      Review of Systems   Constitutional:  Negative for activity change, diaphoresis, fatigue and fever.   HENT: Negative.     Eyes: Negative.    Respiratory: Negative.  Negative for cough, chest tightness and shortness of breath.    Cardiovascular:  Negative for chest pain, palpitations and leg swelling.   Gastrointestinal: Negative.    Endocrine: Negative.  Negative for polydipsia, polyphagia and polyuria.   Genitourinary: Negative.  Negative for dysuria, flank pain, frequency, hematuria and urgency.   Musculoskeletal: Negative.  Negative for back pain, joint swelling, neck pain and neck stiffness.   Skin: Negative.    Neurological: Negative.  Negative for dizziness, weakness, light-headedness and headaches.   Hematological:  Negative for adenopathy.   Psychiatric/Behavioral: Negative.  Negative for confusion and sleep disturbance. The patient is not nervous/anxious.      Medical History Reviewed by provider this encounter:     .  Past Medical History   Past Medical History:   Diagnosis Date    Allergic conjunctivitis     Resolved 12/18/2017     Asthma     Disease of thyroid gland     Hypertension      Past Surgical History:   Procedure Laterality Date    COLONOSCOPY  2021    FOOT ARTHRODESIS       Family History   Problem Relation Age of Onset    Hyperlipidemia Mother     Hypertension Mother       reports that he has never smoked. He has  "never used smokeless tobacco. He reports current alcohol use of about 2.0 standard drinks of alcohol per week. He reports current drug use. Drug: Marijuana.  Current Outpatient Medications   Medication Instructions    albuterol (PROVENTIL HFA,VENTOLIN HFA) 90 mcg/act inhaler 2 puffs, Inhalation, Every 6 hours PRN    amLODIPine (NORVASC) 10 mg, Oral, Daily    atorvastatin (LIPITOR) 20 mg, Oral, Daily    cetirizine (ZYRTEC) 10 mg, Oral, Daily    fluticasone (FLONASE) 50 mcg/act nasal spray 1 spray, Nasal, Daily   Allergies[1]   Medications Ordered Prior to Encounter[2]   Social History     Tobacco Use    Smoking status: Never    Smokeless tobacco: Never   Vaping Use    Vaping status: Never Used   Substance and Sexual Activity    Alcohol use: Yes     Alcohol/week: 2.0 standard drinks of alcohol     Types: 2 Standard drinks or equivalent per week     Comment: Social     Drug use: Yes     Types: Marijuana     Comment: socially    Sexual activity: Yes     Partners: Female       Objective   /86   Pulse 90   Temp 98 °F (36.7 °C)   Resp 15   Ht 5' 7\" (1.702 m)   Wt 76.7 kg (169 lb)   SpO2 98%   BMI 26.47 kg/m²     Physical Exam  Vitals and nursing note reviewed.   Constitutional:       General: He is not in acute distress.     Appearance: He is normal weight. He is not ill-appearing, toxic-appearing or diaphoretic.   HENT:      Head: Normocephalic and atraumatic.      Right Ear: External ear normal.      Left Ear: External ear normal.      Nose: Nose normal.     Eyes:      General: No scleral icterus.        Right eye: No discharge.         Left eye: No discharge.      Extraocular Movements: Extraocular movements intact.      Conjunctiva/sclera: Conjunctivae normal.     Neck:      Vascular: No carotid bruit.     Cardiovascular:      Rate and Rhythm: Normal rate and regular rhythm.      Heart sounds: No murmur heard.  Pulmonary:      Effort: Pulmonary effort is normal. No respiratory distress.      Breath sounds: " Normal breath sounds. No wheezing or rales.   Abdominal:      Tenderness: There is no abdominal tenderness. There is no right CVA tenderness or left CVA tenderness.     Musculoskeletal:         General: No tenderness.      Cervical back: Normal range of motion and neck supple.      Right lower leg: No edema.      Left lower leg: No edema.     Skin:     Findings: No rash.     Neurological:      Mental Status: He is alert. Mental status is at baseline.     Psychiatric:         Mood and Affect: Mood normal.         Behavior: Behavior normal.         Thought Content: Thought content normal.         Judgment: Judgment normal.                [1]   Allergies  Allergen Reactions    Cat Dander     Dog Epithelium     Dust Mite Extract     Molds & Smuts     Other     Pollen Extract     Dario Grass Pollen Allergen    [2]   Current Outpatient Medications on File Prior to Visit   Medication Sig Dispense Refill    albuterol (PROVENTIL HFA,VENTOLIN HFA) 90 mcg/act inhaler Inhale 2 puffs every 6 (six) hours as needed for wheezing or shortness of breath 18 g 2    atorvastatin (LIPITOR) 20 mg tablet Take 1 tablet (20 mg total) by mouth daily 90 tablet 3    cetirizine (ZyrTEC) 10 mg tablet Take 1 tablet (10 mg total) by mouth daily 30 tablet 5    fluticasone (FLONASE) 50 mcg/act nasal spray 1 spray into each nostril daily 9.9 mL 4    amLODIPine (NORVASC) 10 mg tablet Take 1 tablet (10 mg total) by mouth daily 90 tablet 3    [DISCONTINUED] Cetirizine HCl (ZyrTEC Allergy) 10 MG CAPS Take by mouth      [DISCONTINUED] pantoprazole (PROTONIX) 40 mg tablet Take 1 tablet (40 mg total) by mouth daily before breakfast (Patient not taking: Reported on 5/6/2024) 90 tablet 1     No current facility-administered medications on file prior to visit.

## 2025-05-20 NOTE — ASSESSMENT & PLAN NOTE
Controlled at 138/86.  Continue amlodipine 10 mg daily with lifestyle modifications.  Discussed is borderline and should be monitored.  Stressed lifestyle modifications and follow-up in 6 months to recheck

## 2025-06-04 DIAGNOSIS — I10 HYPERTENSION, UNSPECIFIED TYPE: ICD-10-CM

## 2025-06-05 RX ORDER — AMLODIPINE BESYLATE 10 MG/1
10 TABLET ORAL DAILY
Qty: 90 TABLET | Refills: 1 | Status: SHIPPED | OUTPATIENT
Start: 2025-06-05 | End: 2026-05-31

## 2025-07-15 DIAGNOSIS — E78.2 MIXED HYPERLIPIDEMIA: ICD-10-CM

## 2025-07-16 RX ORDER — ATORVASTATIN CALCIUM 20 MG/1
20 TABLET, FILM COATED ORAL DAILY
Qty: 90 TABLET | Refills: 1 | Status: SHIPPED | OUTPATIENT
Start: 2025-07-16

## 2025-08-05 DIAGNOSIS — J30.1 SEASONAL ALLERGIC RHINITIS DUE TO POLLEN: ICD-10-CM

## 2025-08-07 RX ORDER — CETIRIZINE HYDROCHLORIDE 10 MG/1
10 TABLET ORAL DAILY
Qty: 30 TABLET | Refills: 5 | Status: SHIPPED | OUTPATIENT
Start: 2025-08-07